# Patient Record
Sex: FEMALE | Race: BLACK OR AFRICAN AMERICAN | NOT HISPANIC OR LATINO | Employment: UNEMPLOYED | URBAN - NONMETROPOLITAN AREA
[De-identification: names, ages, dates, MRNs, and addresses within clinical notes are randomized per-mention and may not be internally consistent; named-entity substitution may affect disease eponyms.]

---

## 2020-09-02 ENCOUNTER — TRANSCRIBE ORDERS (OUTPATIENT)
Dept: ADMINISTRATIVE | Facility: HOSPITAL | Age: 20
End: 2020-09-02

## 2020-09-02 DIAGNOSIS — R10.9 ABDOMINAL PAIN, UNSPECIFIED ABDOMINAL LOCATION: Primary | ICD-10-CM

## 2020-09-02 DIAGNOSIS — R11.10 VOMITING, INTRACTABILITY OF VOMITING NOT SPECIFIED, PRESENCE OF NAUSEA NOT SPECIFIED, UNSPECIFIED VOMITING TYPE: ICD-10-CM

## 2020-09-03 ENCOUNTER — TRANSCRIBE ORDERS (OUTPATIENT)
Dept: ADMINISTRATIVE | Facility: HOSPITAL | Age: 20
End: 2020-09-03

## 2020-09-03 ENCOUNTER — LAB (OUTPATIENT)
Dept: LAB | Facility: HOSPITAL | Age: 20
End: 2020-09-03

## 2020-09-03 DIAGNOSIS — Z01.818 PRE-OP TESTING: Primary | ICD-10-CM

## 2020-09-03 DIAGNOSIS — O21.9 PERSISTENT HYPEREMESIS VOMITING, ARISING DURING PREGNANCY: Primary | ICD-10-CM

## 2020-09-03 LAB — SARS-COV-2 N GENE RESP QL NAA+PROBE: NOT DETECTED

## 2020-09-03 PROCEDURE — 87635 SARS-COV-2 COVID-19 AMP PRB: CPT | Performed by: OBSTETRICS & GYNECOLOGY

## 2020-09-03 PROCEDURE — C9803 HOPD COVID-19 SPEC COLLECT: HCPCS | Performed by: OBSTETRICS & GYNECOLOGY

## 2020-09-04 ENCOUNTER — HOSPITAL ENCOUNTER (OUTPATIENT)
Dept: GENERAL RADIOLOGY | Facility: HOSPITAL | Age: 20
Discharge: HOME | End: 2020-09-04
Admitting: NURSE PRACTITIONER

## 2020-09-04 ENCOUNTER — APPOINTMENT (OUTPATIENT)
Dept: GENERAL RADIOLOGY | Facility: HOSPITAL | Age: 20
End: 2020-09-04

## 2020-09-04 ENCOUNTER — HOSPITAL ENCOUNTER (INPATIENT)
Facility: HOSPITAL | Age: 20
LOS: 4 days | Discharge: HOME | End: 2020-09-08
Attending: OBSTETRICS & GYNECOLOGY | Admitting: OBSTETRICS & GYNECOLOGY

## 2020-09-04 PROBLEM — O21.0 HYPEREMESIS AFFECTING PREGNANCY, ANTEPARTUM: Status: ACTIVE | Noted: 2020-09-04

## 2020-09-04 LAB
ALBUMIN SERPL-MCNC: 3.9 G/DL (ref 3.5–5.2)
ALBUMIN/GLOB SERPL: 1.3 G/DL
ALP SERPL-CCNC: 127 U/L (ref 39–117)
ALT SERPL W P-5'-P-CCNC: 27 U/L (ref 1–33)
ANION GAP SERPL CALCULATED.3IONS-SCNC: 18 MMOL/L (ref 5–15)
AST SERPL-CCNC: 26 U/L (ref 1–32)
BILIRUB SERPL-MCNC: 1 MG/DL (ref 0–1.2)
BUN SERPL-MCNC: 4 MG/DL (ref 6–20)
BUN/CREAT SERPL: 10 (ref 7–25)
CA-I BLD-MCNC: 4.84 MG/DL (ref 4.6–5.4)
CALCIUM SPEC-SCNC: 9.2 MG/DL (ref 8.6–10.5)
CHLORIDE SERPL-SCNC: 101 MMOL/L (ref 98–107)
CHOLEST SERPL-MCNC: 197 MG/DL (ref 0–200)
CO2 SERPL-SCNC: 18 MMOL/L (ref 22–29)
CREAT SERPL-MCNC: 0.4 MG/DL (ref 0.57–1)
CRP SERPL-MCNC: 1.47 MG/DL (ref 0–0.5)
GFR SERPL CREATININE-BSD FRML MDRD: >150 ML/MIN/1.73
GLOBULIN UR ELPH-MCNC: 3.1 GM/DL
GLUCOSE BLDC GLUCOMTR-MCNC: 87 MG/DL (ref 70–130)
GLUCOSE BLDC GLUCOMTR-MCNC: 97 MG/DL (ref 70–130)
GLUCOSE SERPL-MCNC: 93 MG/DL (ref 65–99)
Lab: NORMAL
MAGNESIUM SERPL-MCNC: 2 MG/DL (ref 1.6–2.6)
PHOSPHATE SERPL-MCNC: 2.5 MG/DL (ref 2.5–4.5)
POTASSIUM SERPL-SCNC: 3.7 MMOL/L (ref 3.5–5.2)
PROT SERPL-MCNC: 7 G/DL (ref 6–8.5)
SODIUM SERPL-SCNC: 137 MMOL/L (ref 136–145)
TRIGL SERPL-MCNC: 171 MG/DL (ref 0–150)

## 2020-09-04 PROCEDURE — 84134 ASSAY OF PREALBUMIN: CPT | Performed by: OBSTETRICS & GYNECOLOGY

## 2020-09-04 PROCEDURE — 3E0336Z INTRODUCTION OF NUTRITIONAL SUBSTANCE INTO PERIPHERAL VEIN, PERCUTANEOUS APPROACH: ICD-10-PCS | Performed by: OBSTETRICS & GYNECOLOGY

## 2020-09-04 PROCEDURE — 83735 ASSAY OF MAGNESIUM: CPT | Performed by: OBSTETRICS & GYNECOLOGY

## 2020-09-04 PROCEDURE — 82962 GLUCOSE BLOOD TEST: CPT

## 2020-09-04 PROCEDURE — 02HV33Z INSERTION OF INFUSION DEVICE INTO SUPERIOR VENA CAVA, PERCUTANEOUS APPROACH: ICD-10-PCS | Performed by: OBSTETRICS & GYNECOLOGY

## 2020-09-04 PROCEDURE — 84100 ASSAY OF PHOSPHORUS: CPT | Performed by: OBSTETRICS & GYNECOLOGY

## 2020-09-04 PROCEDURE — 59025 FETAL NON-STRESS TEST: CPT

## 2020-09-04 PROCEDURE — 82465 ASSAY BLD/SERUM CHOLESTEROL: CPT | Performed by: OBSTETRICS & GYNECOLOGY

## 2020-09-04 PROCEDURE — 80053 COMPREHEN METABOLIC PANEL: CPT | Performed by: OBSTETRICS & GYNECOLOGY

## 2020-09-04 PROCEDURE — 84478 ASSAY OF TRIGLYCERIDES: CPT | Performed by: OBSTETRICS & GYNECOLOGY

## 2020-09-04 PROCEDURE — C1751 CATH, INF, PER/CENT/MIDLINE: HCPCS

## 2020-09-04 PROCEDURE — 82330 ASSAY OF CALCIUM: CPT

## 2020-09-04 PROCEDURE — 86140 C-REACTIVE PROTEIN: CPT | Performed by: OBSTETRICS & GYNECOLOGY

## 2020-09-04 PROCEDURE — 74240 X-RAY XM UPR GI TRC 1CNTRST: CPT

## 2020-09-04 RX ORDER — PROMETHAZINE HYDROCHLORIDE 25 MG/1
25 SUPPOSITORY RECTAL EVERY 6 HOURS PRN
Status: DISCONTINUED | OUTPATIENT
Start: 2020-09-04 | End: 2020-09-08 | Stop reason: HOSPADM

## 2020-09-04 RX ORDER — LIDOCAINE HYDROCHLORIDE 10 MG/ML
1 INJECTION, SOLUTION EPIDURAL; INFILTRATION; INTRACAUDAL; PERINEURAL ONCE
Status: COMPLETED | OUTPATIENT
Start: 2020-09-04 | End: 2020-09-04

## 2020-09-04 RX ADMIN — I.V. FAT EMULSION 50 G: 20 EMULSION INTRAVENOUS at 17:15

## 2020-09-04 RX ADMIN — ASCORBIC ACID, VITAMIN A PALMITATE, CHOLECALCIFEROL, THIAMINE HYDROCHLORIDE, RIBOFLAVIN-5 PHOSPHATE SODIUM, PYRIDOXINE HYDROCHLORIDE, NIACINAMIDE, DEXPANTHENOL, ALPHA-TOCOPHEROL ACETATE, VITAMIN K1, FOLIC ACID, BIOTIN, CYANOCOBALAMIN: 200; 3300; 200; 6; 3.6; 6; 40; 15; 10; 150; 600; 60; 5 INJECTION, SOLUTION INTRAVENOUS at 17:14

## 2020-09-04 RX ADMIN — PROMETHAZINE HYDROCHLORIDE 25 MG: 25 SUPPOSITORY RECTAL at 22:17

## 2020-09-04 RX ADMIN — LIDOCAINE HYDROCHLORIDE 1 ML: 10 INJECTION, SOLUTION EPIDURAL; INFILTRATION; INTRACAUDAL; PERINEURAL at 16:12

## 2020-09-04 RX ADMIN — BARIUM SULFATE 75 ML: 0.6 SUSPENSION ORAL at 13:20

## 2020-09-04 NOTE — PROGRESS NOTES
MFM note    Multiple conversations about this patient over the last few weeks    Nubia is a 22yo   Currently 30 6/7 weeks  Prenatal care with Dr Foreman at [facility]    Has had worsening reflux x weeks  Worsened about 2 weeks ago  Has had multiple evaluations at [facility] as well as a 3 day admission secondary to persistent pain, reflux and vomiting. Was going to have in my office yesterday, but wanted the upper GI evaluation and that needed a covid test performed prior (which was done, negative).     Prenatal care otherwise without issue    Primarily benefited from last admission from being npo. Efforts to reintroduce any nutrients since that admission have been met with vomiting including projectile vomiting.     Arranged a limited upper GI evaluation.   Results--Severe intermittent gastroesophageal reflux with gastroparesis. No  mechanical gastric obstruction. No duodenal dilatation or malrotation.    Secondary to her significant symptomatology, the degree of reflux and gastroparesis and  gestational age, I recommend TPN be started and for Nubia to be npo for a prolonged period of time    Unable to get PICC line at [facility].   Discussed with Dr Christian Delacruz to be admitted to [facility] [place] for:  Npo  reglan  protonix  PICC line placement  Dietary consultation with the initiation of TPN (standard formulas)  Plan will be for home TPN therefore will need to have discharge planning/coordination of this service.      As always, if there are any questions/concerns, please do not hesitate to contact me.   Thanks  Nish  [phone]

## 2020-09-04 NOTE — PLAN OF CARE
Problem: Patient Care Overview  Goal: Plan of Care Review  Outcome: Ongoing (interventions implemented as appropriate)  Flowsheets (Taken 9/4/2020 3958)  Progress: no change  Plan of Care Reviewed With: other (see comments) (RN)  Outcome Summary: Received call from LDR RN for TPN consult. Per RN, pt is 30 weeks gestation. She has dx of hyperemesis. She will have a PICC line placed for TPN administration. She is NPO. Recommend to start D20% Aa5% with lipids daily. Recommend to run TPN at 20mL x 24 hours, then increase to goal rate of 65mL/hour. This will provide pt with 1873 kcal and 78 grams of protein. Will follow for nutrition needs and make changes as needed.

## 2020-09-04 NOTE — NON STRESS TEST
Nubia Ochoa, a  at 30w6d with an CARMEN of 2020, by Patient Reported, was seen at [facility] for a nonstress test.    Chief Complaint   Patient presents with   • Other     HERE FOR TPN AND PICC LINE PLACEMENT       Patient Active Problem List   Diagnosis   • Hyperemesis affecting pregnancy, antepartum       Start Time: 1540  Stop Time: 1600    Interpretation A  Nonstress Test Interpretation A: Reactive (20 : Nicci Villalobos, RN)  Comments A: DENI VILLALOBOS RNC/C WALKER RNC (20 : Nicci Villalobos, RN)

## 2020-09-04 NOTE — CONSULTS
Patient or patient's representative gives informed consent after an explanation of the risks (air embolism; catheter occlusion; phlebitis; catheter infection; bloodstream infection; vein thrombosis; hematoma/bleeding at the insertion site; slight discomfort; accidental puncture of an artery, nerve, or tendon; dislodging of device), benefits (longer dwell time, outpatient treatment, medications that cannot run peripherally), and alternatives (short peripheral catheter, centrally inserted central line, or permanent catheter) of PICC placement. Time provided to ask and answer questions.    Pt had 4 Tristanian dual lumen PICC placed in left cephalic vein. Pt tolerated procedure well. 40 cm of catheter internal and 0 cm external. Tip confirmation by 3cg. All lumens flush and draw well. Sterile dressing applied.

## 2020-09-05 LAB
ALBUMIN SERPL-MCNC: 3.9 G/DL (ref 3.5–5.2)
ALBUMIN/GLOB SERPL: 1.3 G/DL
ALP SERPL-CCNC: 133 U/L (ref 39–117)
ALT SERPL W P-5'-P-CCNC: 32 U/L (ref 1–33)
ANION GAP SERPL CALCULATED.3IONS-SCNC: 17 MMOL/L (ref 5–15)
AST SERPL-CCNC: 31 U/L (ref 1–32)
BASOPHILS # BLD AUTO: 0.03 10*3/MM3 (ref 0–0.2)
BASOPHILS NFR BLD AUTO: 0.4 % (ref 0–1.5)
BILIRUB SERPL-MCNC: 1 MG/DL (ref 0–1.2)
BUN SERPL-MCNC: 5 MG/DL (ref 6–20)
BUN/CREAT SERPL: 11.6 (ref 7–25)
CA-I BLD-MCNC: 4.82 MG/DL (ref 4.6–5.4)
CALCIUM SPEC-SCNC: 9.4 MG/DL (ref 8.6–10.5)
CHLORIDE SERPL-SCNC: 103 MMOL/L (ref 98–107)
CO2 SERPL-SCNC: 18 MMOL/L (ref 22–29)
CREAT SERPL-MCNC: 0.43 MG/DL (ref 0.57–1)
DEPRECATED RDW RBC AUTO: 41.1 FL (ref 37–54)
EOSINOPHIL # BLD AUTO: 0.05 10*3/MM3 (ref 0–0.4)
EOSINOPHIL NFR BLD AUTO: 0.7 % (ref 0.3–6.2)
ERYTHROCYTE [DISTWIDTH] IN BLOOD BY AUTOMATED COUNT: 13.2 % (ref 12.3–15.4)
GFR SERPL CREATININE-BSD FRML MDRD: >150 ML/MIN/1.73
GLOBULIN UR ELPH-MCNC: 3.1 GM/DL
GLUCOSE BLDC GLUCOMTR-MCNC: 109 MG/DL (ref 70–130)
GLUCOSE BLDC GLUCOMTR-MCNC: 115 MG/DL (ref 70–130)
GLUCOSE BLDC GLUCOMTR-MCNC: 97 MG/DL (ref 70–130)
GLUCOSE BLDC GLUCOMTR-MCNC: 99 MG/DL (ref 70–130)
GLUCOSE SERPL-MCNC: 122 MG/DL (ref 65–99)
HCT VFR BLD AUTO: 34.7 % (ref 34–46.6)
HGB BLD-MCNC: 11.6 G/DL (ref 12–15.9)
IMM GRANULOCYTES # BLD AUTO: 0.19 10*3/MM3 (ref 0–0.05)
IMM GRANULOCYTES NFR BLD AUTO: 2.5 % (ref 0–0.5)
LYMPHOCYTES # BLD AUTO: 1.18 10*3/MM3 (ref 0.7–3.1)
LYMPHOCYTES NFR BLD AUTO: 15.6 % (ref 19.6–45.3)
Lab: NORMAL
MAGNESIUM SERPL-MCNC: 2.1 MG/DL (ref 1.6–2.6)
MCH RBC QN AUTO: 29 PG (ref 26.6–33)
MCHC RBC AUTO-ENTMCNC: 33.4 G/DL (ref 31.5–35.7)
MCV RBC AUTO: 86.8 FL (ref 79–97)
MONOCYTES # BLD AUTO: 0.8 10*3/MM3 (ref 0.1–0.9)
MONOCYTES NFR BLD AUTO: 10.6 % (ref 5–12)
NEUTROPHILS NFR BLD AUTO: 5.29 10*3/MM3 (ref 1.7–7)
NEUTROPHILS NFR BLD AUTO: 70.2 % (ref 42.7–76)
NRBC BLD AUTO-RTO: 0 /100 WBC (ref 0–0.2)
PHOSPHATE SERPL-MCNC: 2.6 MG/DL (ref 2.5–4.5)
PLATELET # BLD AUTO: 350 10*3/MM3 (ref 140–450)
PMV BLD AUTO: 11.1 FL (ref 6–12)
POTASSIUM SERPL-SCNC: 3.1 MMOL/L (ref 3.5–5.2)
PREALB SERPL-MCNC: 17.9 MG/DL (ref 20–40)
PROT SERPL-MCNC: 7 G/DL (ref 6–8.5)
RBC # BLD AUTO: 4 10*6/MM3 (ref 3.77–5.28)
SODIUM SERPL-SCNC: 138 MMOL/L (ref 136–145)
WBC # BLD AUTO: 7.54 10*3/MM3 (ref 3.4–10.8)
WHOLE BLOOD HOLD SPECIMEN: NORMAL

## 2020-09-05 PROCEDURE — 82962 GLUCOSE BLOOD TEST: CPT

## 2020-09-05 PROCEDURE — 80053 COMPREHEN METABOLIC PANEL: CPT | Performed by: OBSTETRICS & GYNECOLOGY

## 2020-09-05 PROCEDURE — 83735 ASSAY OF MAGNESIUM: CPT | Performed by: OBSTETRICS & GYNECOLOGY

## 2020-09-05 PROCEDURE — 82330 ASSAY OF CALCIUM: CPT

## 2020-09-05 PROCEDURE — 84100 ASSAY OF PHOSPHORUS: CPT | Performed by: OBSTETRICS & GYNECOLOGY

## 2020-09-05 PROCEDURE — 59025 FETAL NON-STRESS TEST: CPT

## 2020-09-05 PROCEDURE — 85025 COMPLETE CBC W/AUTO DIFF WBC: CPT | Performed by: OBSTETRICS & GYNECOLOGY

## 2020-09-05 RX ORDER — PANTOPRAZOLE SODIUM 40 MG/10ML
40 INJECTION, POWDER, LYOPHILIZED, FOR SOLUTION INTRAVENOUS EVERY 12 HOURS SCHEDULED
Status: DISCONTINUED | OUTPATIENT
Start: 2020-09-05 | End: 2020-09-08 | Stop reason: HOSPADM

## 2020-09-05 RX ORDER — METOCLOPRAMIDE HYDROCHLORIDE 5 MG/ML
10 INJECTION INTRAMUSCULAR; INTRAVENOUS EVERY 6 HOURS PRN
Status: DISCONTINUED | OUTPATIENT
Start: 2020-09-05 | End: 2020-09-06

## 2020-09-05 RX ORDER — SODIUM CHLORIDE, SODIUM LACTATE, POTASSIUM CHLORIDE, CALCIUM CHLORIDE 600; 310; 30; 20 MG/100ML; MG/100ML; MG/100ML; MG/100ML
105 INJECTION, SOLUTION INTRAVENOUS CONTINUOUS
Status: DISCONTINUED | OUTPATIENT
Start: 2020-09-05 | End: 2020-09-08 | Stop reason: HOSPADM

## 2020-09-05 RX ORDER — PANTOPRAZOLE SODIUM 40 MG/1
40 TABLET, DELAYED RELEASE ORAL EVERY 12 HOURS SCHEDULED
Status: DISCONTINUED | OUTPATIENT
Start: 2020-09-05 | End: 2020-09-08 | Stop reason: HOSPADM

## 2020-09-05 RX ADMIN — PANTOPRAZOLE SODIUM 40 MG: 40 INJECTION, POWDER, LYOPHILIZED, FOR SOLUTION INTRAVENOUS at 09:12

## 2020-09-05 RX ADMIN — SODIUM CHLORIDE, POTASSIUM CHLORIDE, SODIUM LACTATE AND CALCIUM CHLORIDE 105 ML/HR: 600; 310; 30; 20 INJECTION, SOLUTION INTRAVENOUS at 11:53

## 2020-09-05 RX ADMIN — PANTOPRAZOLE SODIUM 40 MG: 40 INJECTION, POWDER, LYOPHILIZED, FOR SOLUTION INTRAVENOUS at 20:36

## 2020-09-05 RX ADMIN — I.V. FAT EMULSION 50 G: 20 EMULSION INTRAVENOUS at 19:00

## 2020-09-05 RX ADMIN — ASCORBIC ACID, VITAMIN A PALMITATE, CHOLECALCIFEROL, THIAMINE HYDROCHLORIDE, RIBOFLAVIN-5 PHOSPHATE SODIUM, PYRIDOXINE HYDROCHLORIDE, NIACINAMIDE, DEXPANTHENOL, ALPHA-TOCOPHEROL ACETATE, VITAMIN K1, FOLIC ACID, BIOTIN, CYANOCOBALAMIN: 200; 3300; 200; 6; 3.6; 6; 40; 15; 10; 150; 600; 60; 5 INJECTION, SOLUTION INTRAVENOUS at 18:56

## 2020-09-05 RX ADMIN — SODIUM CHLORIDE, POTASSIUM CHLORIDE, SODIUM LACTATE AND CALCIUM CHLORIDE 89 ML/HR: 600; 310; 30; 20 INJECTION, SOLUTION INTRAVENOUS at 20:26

## 2020-09-05 NOTE — H&P
[facility]  Obstetric History and Physical    Chief Complaint   Patient presents with   • Other     HERE FOR TPN AND PICC LINE PLACEMENT       Subjective     Patient is a 20 y.o. female  currently at 31w0d, who was seeing Dr Westbrook. She was having hyperemesis, been hospitalized several times. Upper GI showed gastric reflux and gastroparesis. Dr Westbrook recommended that it was time for hyperalimentation. Bruna had no body that could put in a pick this weekend so she was transferred to [facility] for a pick line and hyperalimentation. Since I was on call [facility] She has lost 12 pounds this past several weeks and feels she cannot keep anything down with out it coming back up. I did notice she had a gall bladder scan that showed sludge.    Her prenatal care is complicated by  hyperemesis gravidarum.  Her previous obstetric/gynecological history is noted for is non-contributory.    The following portions of the patients history were reviewed and updated as appropriate: current medications, allergies, past medical history, past surgical history, past family history, past social history and problem list .       Prenatal Information:   Maternal Prenatal Labs  Blood Type No results found for: ABO   Rh Status No results found for: RH   Antibody Screen No results found for: ABSCRN   Gonnorhea No results found for: GCCX   Chlamydia No results found for: CLAMYDCU   RPR No results found for: RPR   Syphilis Antibody No results found for: SYPHILIS   Rubella No results found for: RUBELLAIGGIN   Hepatitis B Surface Antigen No results found for: HEPBSAG   HIV-1 Antibody No results found for: LABHIV1   Hepatitis C Antibody No results found for: HEPCAB   Rapid Urin Drug Screen No results found for: AMPMETHU, BARBITSCNUR, LABBENZSCN, LABMETHSCN, LABOPIASCN, THCURSCR, COCAINEUR, AMPHETSCREEN, PROPOXSCN, BUPRENORSCNU, METAMPSCNUR, OXYCODONESCN, TRICYCLICSCN   Group B Strep Culture No results found for:  GBSANTIGEN                 Past OB History:     OB History    Para Term  AB Living   2 1 1 0 0 1   SAB TAB Ectopic Molar Multiple Live Births   0 0 0 0 0 1      # Outcome Date GA Lbr Richi/2nd Weight Sex Delivery Anes PTL Lv   2 Current            1 Term 17    M Vag-Spont None  SCOUT       Allergies:  No Known Allergies      Current Facility-Administered Medications:   •  Adult Standard Central TPN, , Intravenous, Q24H (TPN), Last Rate: 20 mL/hr at 20 1714 **AND** Fat Emulsion Plant Based (INTRALIPID,LIPOSYN) 20 % infusion 50 g, 250 mL, Intravenous, Q24H (TPN), Yuri Gonzales MD, Stopped at 20 0530  •  Adult Standard Central TPN, , Intravenous, Q24H (TPN), Yuri Gonzales MD  •  promethazine (PHENERGAN) suppository 25 mg, 25 mg, Rectal, Q6H PRN, Yuri Gonzales MD, 25 mg at 20 2217    HPI      Review of Systems       Otherwise complete ROS reviewed and negative except as mentioned in the HPI.    History reviewed. No pertinent past medical history.    Past Medical History: none    Past Surgical History:   Procedure Laterality Date   • TONSILLECTOMY         Past Surgical History:  This patient has no significant past surgical history    Social History     Socioeconomic History   • Marital status:      Spouse name: Not on file   • Number of children: Not on file   • Years of education: Not on file   • Highest education level: Not on file   Tobacco Use   • Smoking status: Never Smoker   • Smokeless tobacco: Never Used   Substance and Sexual Activity   • Drug use: Never       Social History:  Marital Status:  Single                              Smoker:  Never smoker                             Illicit Drugs:  Illicit drug use:  none                             Alcohol:  Alcohol use: none    History reviewed. No pertinent family history.                              Family History:   Non-Contributory           Objective     Vital Signs Range for the last 24  hours  Temperature: Temp:  [97.4 °F (36.3 °C)-98.4 °F (36.9 °C)] 97.4 °F (36.3 °C)   Temp Source: Temp src: Temporal   BP: BP: (109-126)/(62-76) 116/63   Pulse: Heart Rate:  [] 104   Respirations: Resp:  [16-18] 16   SPO2: SpO2:  [97 %-100 %] 97 %   O2 Amount (l/min):     O2 Devices Device (Oxygen Therapy): room air   Weight: Weight:  [75.3 kg (166 lb)] 75.3 kg (166 lb)     General appearance:  alert, , oriented to person, place, and time, anxious  Mental Status:  normal mood, behavior, speech, dress, motor activity, and thought processes, anxious  Eyes:  pupils equal and reactive to light  Ears:  bilateral TM's and external ear canals normal  Nose:  normal and patent, no erythema, discharge or polyps  Mouth:  mucous membranes moist, pharynx normal without lesions  Neck:  supple, no significant adenopathy  Lymphatics:  no palpable lymphadenopathy, no hepatosplenomegaly  Chest:  clear to auscultation, no wheezes, rales or rhonchi, symmetric air entry  Heart::  normal rate, regular rhythm, normal S1, S2, no murmurs, rubs, clicks or gallops  Abdomen:  soft, nontender, nondistended, no masses or organomegaly  Breasts:  breasts appear normal, no suspicious masses, no skin or nipple changes or axillary nodes  Pelvic:  UTERUS: uterus is normal size, shape, consistency and nontender, enlarged to 30 week's size  Rectal:  normal rectal, no masses  Back exam:  full range of motion, no tenderness, palpable spasm or pain on motion  Neurological:  alert, oriented, normal speech, no focal findings or movement disorder noted  Musculoskeletal:  no joint tenderness, deformity or swelling  Extremities:   peripheral pulses normal, no pedal edema, no clubbing or cyanosis  Skin:  normal coloration and turgor, no rashes, no suspicious skin lesions noted      Presentation: vertex   Cervix: Exam by:     Dilation:closed     Effacement:thick     Station:OOP       Fetal Heart Rate Assessment   Method: Fetal HR Assessment Method: external    Beats/min: Fetal HR (beats/min): 140   Baseline: Fetal Heart Baseline Rate: normal range   Varibility: Fetal HR Variability: moderate (amplitude range 6 to 25 bpm)   Accels: Fetal HR Accelerations: greater than/equal to 15 bpm, lasting at least 15 seconds   Decels: Fetal HR Decelerations: variable   Tracing Category:       Uterine Assessment   Method: Method: palpation, external tocotransducer   Frequency (min): Contraction Frequency (Minutes): occaisonal   Ctx Count in 10 min:     Duration:     Intensity: Contraction Intensity: mild by palpation   Intensity by IUPC:     Resting Tone: Uterine Resting Tone: soft by palpation   Resting Tone by IUPC:     [place] Units:       Laboratory Results: Reviewed  Radiology Review: Yes      Assessment/Plan       Assessment:  1.  Intrauterine pregnancy at 31w0d weeks gestation with reactive fetal status.    2.  nausea/vomiting secondary to gastric reflux and gastroparesis  3.  Obstetrical history significant for persistent nausea and vomiting failed conservative management.  4.  GBS status: No results found for: GBSANTIGEN    Plan:  1. NPO with hyperalimentation 2. Plan of care has been reviewed with patient and male   3.  Risks, benefits of treatment plan have been discussed.  4.  All questions have been answered.  5.        Yuri Gonzales MD  9/5/2020  06:53

## 2020-09-05 NOTE — PROGRESS NOTES
Continued Stay Note   Riki     Patient Name: Nubia Ochoa  MRN: [id number]  Today's Date: 9/5/2020    Admit Date: 9/4/2020    Discharge Plan     Row Name 09/05/20 1502       Plan    Plan Comments  RENA contacted Robert from [facility] in regards to TPN. Robert plans on contacting SW tomorrow to make sure they have all information needed. Possible discharge for tuesday due to holiday. RENA will await phone call.         Discharge Codes    No documentation.             Marquita Cantrell

## 2020-09-05 NOTE — PLAN OF CARE
Vss during shift. Voiding and ambulating in room. PICC in place. TPN and LR infusing. X1 emesis this AM but since has only been spitting into emesis bag. NPO. Had shower today. Spoke with  who will line up home health for TPN administration at home.

## 2020-09-05 NOTE — PROGRESS NOTES
Faterohini Ochoa  : 2000  MRN: [id number]  CSN: [id number]    Antepartum Progress Note    Subjective   19 yo  at 30 weeks with severe hyperemesis and failed conservative management. Emesis times 3 last evening, better with phenergan supp. Patient complains of being very thirsty.     Objective     Min/max vitals past 24 hours:   Temp  Min: 97.4 °F (36.3 °C)  Max: 98.4 °F (36.9 °C)  BP  Min: 109/62  Max: 126/76  Pulse  Min: 80  Max: 121  Resp  Min: 16  Max: 18         General: well developed; well nourished  no acute distress   Heart: Not performed.   Lungs: breathing is unlabored   Abdomen: soft, non-tender; no masses  no umbilical or inguinal hernias are present  no hepato-splenomegaly  fundus firm and non-tender   FHT's: reactive and category 1.  external monitors used   Cervix: was not checked.   Contractions: none   Back: bilateral CVA tenderness is absent           Assessment   1. IUP at 31w0d  2. Hyperemesis with severe gastric reflux and gastroparesis     Plan   1. NPO, parenteral hydration, fetal monitoring    Yuri Gonzales MD  2020  07:07

## 2020-09-05 NOTE — PLAN OF CARE
Problem: Patient Care Overview  Goal: Plan of Care Review  Outcome: Ongoing (interventions implemented as appropriate)  Flowsheets  Taken 9/5/2020 0550  Progress: no change  Outcome Summary: VSS with mild tachycardia. Voiding WNL. Ambulating to restroom. Has had n/v this shift. Order for Phenergan suppository obtained which provides some relief. TPN and Lipids infusing. NPO. Labs ordered. Blood glucose every 6 hours. NST every shift, which as reactive this am. Continue to monitor.  Taken 9/4/2020 2100  Plan of Care Reviewed With: patient;significant other

## 2020-09-05 NOTE — NON STRESS TEST
Nubia Ochoa, a  at 31w0d with an CARMEN of 2020, by Patient Reported, was seen at [facility] for a nonstress test.    Chief Complaint   Patient presents with   • Other     HERE FOR TPN AND PICC LINE PLACEMENT       Patient Active Problem List   Diagnosis   • Hyperemesis affecting pregnancy, antepartum       Start Time:   Stop Time:     Interpretation A  Nonstress Test Interpretation A: Reactive (20 : Kateryna Logan, RN)  Comments A: Verified per HERLINDA Logan RN and HERMANN Bonner RN  (20 : Kateryna Logan, RN)

## 2020-09-05 NOTE — NON STRESS TEST
Nubia Ochoa, a  at 31w0d with an CARMEN of 2020, by Patient Reported, was seen at [facility] for a nonstress test.    Chief Complaint   Patient presents with   • Other     HERE FOR TPN AND PICC LINE PLACEMENT       Patient Active Problem List   Diagnosis   • Hyperemesis affecting pregnancy, antepartum       Start Time: 0458  Stop Time: 0520    Interpretation A  Nonstress Test Interpretation A: Reactive (20 : Lashonda Gan, RN)  Comments A: Verified per HUSSEIN Gan, REJI and HERLINDA Rubio RN (20 : Lashonda Gan, RN)      Mobile monitor not tracing on Obix.  See printed tracing for strip.

## 2020-09-06 LAB
ALBUMIN SERPL-MCNC: 3.4 G/DL (ref 3.5–5.2)
ALBUMIN/GLOB SERPL: 1.1 G/DL
ALP SERPL-CCNC: 120 U/L (ref 39–117)
ALT SERPL W P-5'-P-CCNC: 29 U/L (ref 1–33)
ANION GAP SERPL CALCULATED.3IONS-SCNC: 12 MMOL/L (ref 5–15)
AST SERPL-CCNC: 24 U/L (ref 1–32)
BASOPHILS # BLD AUTO: 0.03 10*3/MM3 (ref 0–0.2)
BASOPHILS NFR BLD AUTO: 0.5 % (ref 0–1.5)
BILIRUB SERPL-MCNC: 1.2 MG/DL (ref 0–1.2)
BUN SERPL-MCNC: 9 MG/DL (ref 6–20)
BUN/CREAT SERPL: 23.7 (ref 7–25)
CA-I BLD-MCNC: 4.9 MG/DL (ref 4.6–5.4)
CALCIUM SPEC-SCNC: 9.1 MG/DL (ref 8.6–10.5)
CHLORIDE SERPL-SCNC: 108 MMOL/L (ref 98–107)
CO2 SERPL-SCNC: 23 MMOL/L (ref 22–29)
CREAT SERPL-MCNC: 0.38 MG/DL (ref 0.57–1)
DEPRECATED RDW RBC AUTO: 42.7 FL (ref 37–54)
EOSINOPHIL # BLD AUTO: 0.06 10*3/MM3 (ref 0–0.4)
EOSINOPHIL NFR BLD AUTO: 1 % (ref 0.3–6.2)
ERYTHROCYTE [DISTWIDTH] IN BLOOD BY AUTOMATED COUNT: 13.4 % (ref 12.3–15.4)
GFR SERPL CREATININE-BSD FRML MDRD: >150 ML/MIN/1.73
GLOBULIN UR ELPH-MCNC: 3 GM/DL
GLUCOSE BLDC GLUCOMTR-MCNC: 109 MG/DL (ref 70–130)
GLUCOSE BLDC GLUCOMTR-MCNC: 123 MG/DL (ref 70–130)
GLUCOSE BLDC GLUCOMTR-MCNC: 124 MG/DL (ref 70–130)
GLUCOSE BLDC GLUCOMTR-MCNC: 98 MG/DL (ref 70–130)
GLUCOSE SERPL-MCNC: 110 MG/DL (ref 65–99)
HCT VFR BLD AUTO: 34.4 % (ref 34–46.6)
HGB BLD-MCNC: 11.4 G/DL (ref 12–15.9)
IMM GRANULOCYTES # BLD AUTO: 0.05 10*3/MM3 (ref 0–0.05)
IMM GRANULOCYTES NFR BLD AUTO: 0.8 % (ref 0–0.5)
LYMPHOCYTES # BLD AUTO: 1.06 10*3/MM3 (ref 0.7–3.1)
LYMPHOCYTES NFR BLD AUTO: 17.3 % (ref 19.6–45.3)
Lab: NORMAL
MAGNESIUM SERPL-MCNC: 1.9 MG/DL (ref 1.6–2.6)
MCH RBC QN AUTO: 29.5 PG (ref 26.6–33)
MCHC RBC AUTO-ENTMCNC: 33.1 G/DL (ref 31.5–35.7)
MCV RBC AUTO: 88.9 FL (ref 79–97)
MONOCYTES # BLD AUTO: 0.73 10*3/MM3 (ref 0.1–0.9)
MONOCYTES NFR BLD AUTO: 11.9 % (ref 5–12)
NEUTROPHILS NFR BLD AUTO: 4.19 10*3/MM3 (ref 1.7–7)
NEUTROPHILS NFR BLD AUTO: 68.5 % (ref 42.7–76)
NRBC BLD AUTO-RTO: 0 /100 WBC (ref 0–0.2)
PHOSPHATE SERPL-MCNC: 2.4 MG/DL (ref 2.5–4.5)
PLATELET # BLD AUTO: 296 10*3/MM3 (ref 140–450)
PMV BLD AUTO: 10.7 FL (ref 6–12)
POTASSIUM SERPL-SCNC: 3 MMOL/L (ref 3.5–5.2)
PROT SERPL-MCNC: 6.4 G/DL (ref 6–8.5)
RBC # BLD AUTO: 3.87 10*6/MM3 (ref 3.77–5.28)
SODIUM SERPL-SCNC: 143 MMOL/L (ref 136–145)
WBC # BLD AUTO: 6.12 10*3/MM3 (ref 3.4–10.8)

## 2020-09-06 PROCEDURE — 80053 COMPREHEN METABOLIC PANEL: CPT | Performed by: OBSTETRICS & GYNECOLOGY

## 2020-09-06 PROCEDURE — 83735 ASSAY OF MAGNESIUM: CPT | Performed by: OBSTETRICS & GYNECOLOGY

## 2020-09-06 PROCEDURE — 59025 FETAL NON-STRESS TEST: CPT

## 2020-09-06 PROCEDURE — 85025 COMPLETE CBC W/AUTO DIFF WBC: CPT | Performed by: OBSTETRICS & GYNECOLOGY

## 2020-09-06 PROCEDURE — 25010000002 METOCLOPRAMIDE PER 10 MG: Performed by: OBSTETRICS & GYNECOLOGY

## 2020-09-06 PROCEDURE — 82962 GLUCOSE BLOOD TEST: CPT

## 2020-09-06 PROCEDURE — 82330 ASSAY OF CALCIUM: CPT

## 2020-09-06 PROCEDURE — 84100 ASSAY OF PHOSPHORUS: CPT | Performed by: OBSTETRICS & GYNECOLOGY

## 2020-09-06 RX ORDER — METOCLOPRAMIDE HYDROCHLORIDE 5 MG/ML
10 INJECTION INTRAMUSCULAR; INTRAVENOUS EVERY 8 HOURS
Status: DISCONTINUED | OUTPATIENT
Start: 2020-09-06 | End: 2020-09-08 | Stop reason: HOSPADM

## 2020-09-06 RX ADMIN — I.V. FAT EMULSION 50 G: 20 EMULSION INTRAVENOUS at 18:48

## 2020-09-06 RX ADMIN — METOCLOPRAMIDE 10 MG: 5 INJECTION, SOLUTION INTRAMUSCULAR; INTRAVENOUS at 11:07

## 2020-09-06 RX ADMIN — PANTOPRAZOLE SODIUM 40 MG: 40 INJECTION, POWDER, LYOPHILIZED, FOR SOLUTION INTRAVENOUS at 08:41

## 2020-09-06 RX ADMIN — PANTOPRAZOLE SODIUM 40 MG: 40 INJECTION, POWDER, LYOPHILIZED, FOR SOLUTION INTRAVENOUS at 21:05

## 2020-09-06 RX ADMIN — SODIUM CHLORIDE, POTASSIUM CHLORIDE, SODIUM LACTATE AND CALCIUM CHLORIDE 60 ML/HR: 600; 310; 30; 20 INJECTION, SOLUTION INTRAVENOUS at 11:07

## 2020-09-06 RX ADMIN — METOCLOPRAMIDE 10 MG: 5 INJECTION, SOLUTION INTRAMUSCULAR; INTRAVENOUS at 18:48

## 2020-09-06 RX ADMIN — ASCORBIC ACID, VITAMIN A PALMITATE, CHOLECALCIFEROL, THIAMINE HYDROCHLORIDE, RIBOFLAVIN-5 PHOSPHATE SODIUM, PYRIDOXINE HYDROCHLORIDE, NIACINAMIDE, DEXPANTHENOL, ALPHA-TOCOPHEROL ACETATE, VITAMIN K1, FOLIC ACID, BIOTIN, CYANOCOBALAMIN: 200; 3300; 200; 6; 3.6; 6; 40; 15; 10; 150; 600; 60; 5 INJECTION, SOLUTION INTRAVENOUS at 18:48

## 2020-09-06 NOTE — PROGRESS NOTES
Call placed to Yulia in lab about ionized CA order from 0600 this AM. She stated that she didn't have an order on her for ionized ca and that labs had already been drawn. RT looked into order it was started on Friday 9/4 for 3 occurences at 6 AM to end today 9/6 @ 0600 - then q Monday @ 0600. She will draw lab test and call RT when done for processing.

## 2020-09-06 NOTE — PAYOR COMM NOTE
"ADMIT INPT 20  UR  [phone]    PLEASE NOTE:  NOT LATE NOTIFICATION DUE TO WEEKEND    Ken Ochoa (20 y.o. Female)     Date of Birth Social Security Number Address Home Phone MRN    2000  [address] [place] [address] [phone] [id number]    [employer] Mar[employer] Status          Confucianism        Admission Date Admission Type Admitting Provider Attending Provider Department, Room/Bed    20 Elective Yuri Gonzales MD England, Gary Thomas, MD [facility] MOTHER BABY 2A, M206/    Discharge Date Discharge Disposition Discharge Destination                       Attending Provider:  Yuri Gonzales MD    Allergies:  No Known Allergies    Isolation:  None   Infection:  None   Code Status:  CPR    Ht:  160 cm (63\")   Wt:  74.5 kg (164 lb 3.2 oz)    Admission Cmt:  None   Principal Problem:  None                Active Insurance as of 2020     Primary Coverage     Payor Plan Insurance Group Employer/Plan Group    [employer] [place] [employer] [place]      Payor Plan Address Payor Plan Phone Number Payor Plan Fax Number Effective Dates    [address] [address] [phone]  2020 - None Entered    [facility] [id number]       Subscriber Name Subscriber Birth Date Member ID       KEN OCHOA 2000 [id number]                 Emergency Contacts      (Rel.) Home Phone Work Phone Mobile Phone    MEAGAN MYERS (Mother) -- -- [phone]    MIGUELINA OCHOA (Spouse) [phone] -- --               History & Physical      Yuri Gonzales MD at 20 0652          [facility]  Obstetric History and Physical    Chief Complaint   Patient presents with   • Other     HERE FOR TPN AND PICC LINE PLACEMENT       Subjective     Patient is a 20 y.o. female  currently at 31w0d, who was seeing Dr Westbrook. She was having hyperemesis, been hospitalized several times. Upper GI showed gastric reflux and gastroparesis. Dr Westbrook recommended that it was " time for hyperalimentation. Bruna had no body that could put in a pick this weekend so she was transferred to [facility] for a pick line and hyperalimentation. Since [facility] was on call [facility]. She has lost 12 pounds this past several weeks and feels she cannot keep anything down with out it coming back up. I did notice she had a gall bladder scan that showed sludge.    Her prenatal care is complicated by  hyperemesis gravidarum.  Her previous obstetric/gynecological history is noted for is non-contributory.    The following portions of the patients history were reviewed and updated as appropriate: current medications, allergies, past medical history, past surgical history, past family history, past social history and problem list .       Prenatal Information:   Maternal Prenatal Labs  Blood Type No results found for: ABO   Rh Status No results found for: RH   Antibody Screen No results found for: ABSCRN   Gonnorhea No results found for: GCCX   Chlamydia No results found for: CLAMYDCU   RPR No results found for: RPR   Syphilis Antibody No results found for: SYPHILIS   Rubella No results found for: RUBELLAIGGIN   Hepatitis B Surface Antigen No results found for: HEPBSAG   HIV-1 Antibody No results found for: LABHIV1   Hepatitis C Antibody No results found for: HEPCAB   Rapid Urin Drug Screen No results found for: AMPMETHU, BARBITSCNUR, LABBENZSCN, LABMETHSCN, LABOPIASCN, THCURSCR, COCAINEUR, AMPHETSCREEN, PROPOXSCN, BUPRENORSCNU, METAMPSCNUR, OXYCODONESCN, TRICYCLICSCN   Group B Strep Culture No results found for: GBSANTIGEN                 Past OB History:     OB History    Para Term  AB Living   2 1 1 0 0 1   SAB TAB Ectopic Molar Multiple Live Births   0 0 0 0 0 1      # Outcome Date GA Lbr Richi/2nd Weight Sex Delivery Anes PTL Lv   2 Current            1 Term 17    M Vag-Spont None  SCOUT       Allergies:  No Known Allergies      Current Facility-Administered Medications:   •   Adult Standard Central TPN, , Intravenous, Q24H (TPN), Last Rate: 20 mL/hr at 09/04/20 1714 **AND** Fat Emulsion Plant Based (INTRALIPID,LIPOSYN) 20 % infusion 50 g, 250 mL, Intravenous, Q24H (TPN), Yuri Gonzales MD, Stopped at 09/05/20 0530  •  Adult Standard Central TPN, , Intravenous, Q24H (TPN), Yuri Gonzales MD  •  promethazine (PHENERGAN) suppository 25 mg, 25 mg, Rectal, Q6H PRN, Yuri Gonzales MD, 25 mg at 09/04/20 2217    HPI      Review of Systems       Otherwise complete ROS reviewed and negative except as mentioned in the HPI.    History reviewed. No pertinent past medical history.    Past Medical History: none    Past Surgical History:   Procedure Laterality Date   • TONSILLECTOMY         Past Surgical History:  This patient has no significant past surgical history    Social History     Socioeconomic History   • Marital status:      Spouse name: Not on file   • Number of children: Not on file   • Years of education: Not on file   • Highest education level: Not on file   Tobacco Use   • Smoking status: Never Smoker   • Smokeless tobacco: Never Used   Substance and Sexual Activity   • Drug use: Never       Social History:  Marital Status:  Single                              Smoker:  Never smoker                             Illicit Drugs:  Illicit drug use:  none                             Alcohol:  Alcohol use: none    History reviewed. No pertinent family history.                              Family History:   Non-Contributory           Objective     Vital Signs Range for the last 24 hours  Temperature: Temp:  [97.4 °F (36.3 °C)-98.4 °F (36.9 °C)] 97.4 °F (36.3 °C)   Temp Source: Temp src: Temporal   BP: BP: (109-126)/(62-76) 116/63   Pulse: Heart Rate:  [] 104   Respirations: Resp:  [16-18] 16   SPO2: SpO2:  [97 %-100 %] 97 %   O2 Amount (l/min):     O2 Devices Device (Oxygen Therapy): room air   Weight: Weight:  [75.3 kg (166 lb)] 75.3 kg (166 lb)     General  appearance:  alert, , oriented to person, place, and time, anxious  Mental Status:  normal mood, behavior, speech, dress, motor activity, and thought processes, anxious  Eyes:  pupils equal and reactive to light  Ears:  bilateral TM's and external ear canals normal  Nose:  normal and patent, no erythema, discharge or polyps  Mouth:  mucous membranes moist, pharynx normal without lesions  Neck:  supple, no significant adenopathy  Lymphatics:  no palpable lymphadenopathy, no hepatosplenomegaly  Chest:  clear to auscultation, no wheezes, rales or rhonchi, symmetric air entry  Heart::  normal rate, regular rhythm, normal S1, S2, no murmurs, rubs, clicks or gallops  Abdomen:  soft, nontender, nondistended, no masses or organomegaly  Breasts:  breasts appear normal, no suspicious masses, no skin or nipple changes or axillary nodes  Pelvic:  UTERUS: uterus is normal size, shape, consistency and nontender, enlarged to 30 week's size  Rectal:  normal rectal, no masses  Back exam:  full range of motion, no tenderness, palpable spasm or pain on motion  Neurological:  alert, oriented, normal speech, no focal findings or movement disorder noted  Musculoskeletal:  no joint tenderness, deformity or swelling  Extremities:   peripheral pulses normal, no pedal edema, no clubbing or cyanosis  Skin:  normal coloration and turgor, no rashes, no suspicious skin lesions noted      Presentation: vertex   Cervix: Exam by:     Dilation:closed     Effacement:thick     Station:OOP       Fetal Heart Rate Assessment   Method: Fetal HR Assessment Method: external   Beats/min: Fetal HR (beats/min): 140   Baseline: Fetal Heart Baseline Rate: normal range   Varibility: Fetal HR Variability: moderate (amplitude range 6 to 25 bpm)   Accels: Fetal HR Accelerations: greater than/equal to 15 bpm, lasting at least 15 seconds   Decels: Fetal HR Decelerations: variable   Tracing Category:       Uterine Assessment   Method: Method: palpation, external  tocotransducer   Frequency (min): Contraction Frequency (Minutes): occaisonal   Ctx Count in 10 min:     Duration:     Intensity: Contraction Intensity: mild by palpation   Intensity by IUPC:     Resting Tone: Uterine Resting Tone: soft by palpation   Resting Tone by IUPC:     Fatou Units:       Laboratory Results: Reviewed  Radiology Review: Yes      Assessment/Plan       Assessment:  1.  Intrauterine pregnancy at 31w0d weeks gestation with reactive fetal status.    2.  nausea/vomiting secondary to gastric reflux and gastroparesis  3.  Obstetrical history significant for persistent nausea and vomiting failed conservative management.  4.  GBS status: No results found for: GBSANTIGEN    Plan:  1. NPO with hyperalimentation 2. Plan of care has been reviewed with patient and male   3.  Risks, benefits of treatment plan have been discussed.  4.  All questions have been answered.  5.        Yuri Gonzales MD  9/5/2020  06:53      Electronically signed by Yuri Gonzales MD at 09/05/20 0706       Emergency Department Notes    No notes of this type exist for this encounter.         Vital Signs (last 3 days)     Date/Time   Temp   Temp src   Pulse   Resp   BP   Patient Position   SpO2    09/06/20 0840   97.8 (36.6)   Temporal   97   17   110/60   Lying   99    09/06/20 0500   98.1 (36.7)   Oral   113   18   112/72   Sitting   96    09/06/20 0018   97.8 (36.6)   Oral   108   20   121/78   Sitting   --    09/05/20 2029   98 (36.7)   Temporal   110   18   125/67   Sitting   100    09/05/20 1600   97.9 (36.6)   Temporal   98   18   115/62   Sitting   98    09/05/20 1200   98 (36.7)   Temporal   101   16   118/72   Sitting   99    09/05/20 0908   97.6 (36.4)   Temporal   99   16   124/80   Sitting   98    09/05/20 0334   97.4 (36.3)   Temporal   104   16   116/63   Sitting   97    09/05/20 0002   98.2 (36.8)   Temporal   117   18   118/69   Sitting   100    09/04/20 2100   98.4 (36.9)   Oral   (!) 121    18   121/74   Sitting   97    09/04/20 1750   98.3 (36.8)   Oral   112   16   126/76   Sitting   98    09/04/20 1450   98.2 (36.8)   Temporal   80   18   109/62   Lying   --              Oxygen Therapy (last 3 days)     Date/Time   SpO2   Device (Oxygen Therapy)   Flow (L/min)   Oxygen Concentration (%)   ETCO2 (mmHg)    09/06/20 0840   99   room air   --   --   --    09/06/20 0500   96   room air   --   --   --    09/05/20 2029   100   room air   --   --   --    09/05/20 1600   98   room air   --   --   --    09/05/20 1200   99   room air   --   --   --    09/05/20 0908   98   room air   --   --   --    09/05/20 0334   97   room air   --   --   --    09/05/20 0002   100   room air   --   --   --    09/04/20 2100   97   room air   --   --   --    09/04/20 1750   98   --   --   --   --            Intake & Output (last 3 days)       09/03 0701 - 09/04 0700 09/04 0701 - 09/05 0700 09/05 0701 - 09/06 0700 09/06 0701 - 09/07 0700    I.V. (mL/kg)   819 (11) 156.6 (2.1)    TPN  456.8 1023 173.1    Total Intake(mL/kg)  456.8 (6.1) 1842 (24.7) 329.7 (4.4)    Urine (mL/kg/hr)  350 600 (0.3)     Emesis/NG output  500 200     Total Output  850 800     Net  -393.2 +1042 +329.7            Emesis Unmeasured Occurrence  1 x           Lines, Drains & Airways    Active LDAs     Name:   Placement date:   Placement time:   Site:   Days:    PICC Double Lumen 09/04/20 Left Cephalic   09/04/20    1613    Cephalic   1         Inactive LDAs     None                  Facility-Administered Medications as of 9/6/2020   Medication Dose Route Frequency Provider Last Rate Last Dose   • Adult Standard Central TPN   Intravenous Q24H (TPN) Yuri Gonzales MD 65 mL/hr at 09/06/20 0950     • [COMPLETED] Barium Sulfate oral suspension 75 mL  75 mL Oral Once Madalyn Hickey APRN   75 mL at 09/04/20 1320   • Fat Emulsion Plant Based (INTRALIPID,LIPOSYN) 20 % infusion 50 g  250 mL Intravenous Q24H (TPN) Yuri Gonzales MD   Stopped at  09/06/20 0700   • lactated ringers infusion  105 mL/hr Intravenous Continuous Yuri Gnozales MD 60 mL/hr at 09/06/20 0950 60 mL/hr at 09/06/20 0950   • [COMPLETED] lidocaine PF 1% (XYLOCAINE) injection 1 mL  1 mL Injection Once Yuri Gonzales MD   1 mL at 09/04/20 1612   • metoclopramide (REGLAN) injection 10 mg  10 mg Intravenous Q8H Sebastian Westbrook MD       • pantoprazole (PROTONIX) EC tablet 40 mg  40 mg Oral Q12H Yuri Gonzales MD        Or   • pantoprazole (PROTONIX) injection 40 mg  40 mg Intravenous Q12H Yuri Gonzales MD   40 mg at 09/06/20 0841   • promethazine (PHENERGAN) suppository 25 mg  25 mg Rectal Q6H PRN Yuri Gonzales MD   25 mg at 09/04/20 2217     Lab Results (last 72 hours)     Procedure Component Value Units Date/Time    Calcium, Ionized [[id number]] Collected:  09/06/20 0819    Specimen:  Blood Updated:  09/06/20 0847     Ionized Calcium 4.90 mg/dL      Collected by [id number]     Comment: Meter: [id number]     :  [id number]       POC Glucose Once [[id number]]  (Normal) Collected:  09/06/20 0625    Specimen:  Blood Updated:  09/06/20 0637     Glucose 123 mg/dL      Comment: : [id number] Grzegorz Monroy ID: [id number]       Comprehensive Metabolic Panel [[id number]]  (Abnormal) Collected:  09/06/20 0540    Specimen:  Blood Updated:  09/06/20 0624     Glucose 110 mg/dL      BUN 9 mg/dL      Creatinine 0.38 mg/dL      Sodium 143 mmol/L      Potassium 3.0 mmol/L      Chloride 108 mmol/L      CO2 23.0 mmol/L      Calcium 9.1 mg/dL      Total Protein 6.4 g/dL      Albumin 3.40 g/dL      ALT (SGPT) 29 U/L      AST (SGOT) 24 U/L      Alkaline Phosphatase 120 U/L      Total Bilirubin 1.2 mg/dL      eGFR  African Amer >150 mL/min/1.73      Globulin 3.0 gm/dL      A/G Ratio 1.1 g/dL      BUN/Creatinine Ratio 23.7     Anion Gap 12.0 mmol/L     Narrative:       GFR Normal >60  Chronic Kidney Disease <60  Kidney Failure <15      Magnesium  [[id number]]  (Normal) Collected:  09/06/20 0540    Specimen:  Blood Updated:  09/06/20 0622     Magnesium 1.9 mg/dL     Phosphorus [[id number]]  (Abnormal) Collected:  09/06/20 0540    Specimen:  Blood Updated:  09/06/20 0622     Phosphorus 2.4 mg/dL     CBC & Differential [[id number]] Collected:  09/06/20 0540    Specimen:  Blood Updated:  09/06/20 0600    Narrative:       The following orders were created for panel order CBC & Differential.  Procedure                               Abnormality         Status                     ---------                               -----------         ------                     CBC Auto Differential[[id number]]        Abnormal            Final result                 Please view results for these tests on the individual orders.    CBC Auto Differential [[id number]]  (Abnormal) Collected:  09/06/20 0540    Specimen:  Blood Updated:  09/06/20 0600     WBC 6.12 10*3/mm3      RBC 3.87 10*6/mm3      Hemoglobin 11.4 g/dL      Hematocrit 34.4 %      MCV 88.9 fL      MCH 29.5 pg      MCHC 33.1 g/dL      RDW 13.4 %      RDW-SD 42.7 fl      MPV 10.7 fL      Platelets 296 10*3/mm3      Neutrophil % 68.5 %      Lymphocyte % 17.3 %      Monocyte % 11.9 %      Eosinophil % 1.0 %      Basophil % 0.5 %      Immature Grans % 0.8 %      Neutrophils, Absolute 4.19 10*3/mm3      Lymphocytes, Absolute 1.06 10*3/mm3      Monocytes, Absolute 0.73 10*3/mm3      Eosinophils, Absolute 0.06 10*3/mm3      Basophils, Absolute 0.03 10*3/mm3      Immature Grans, Absolute 0.05 10*3/mm3      nRBC 0.0 /100 WBC     POC Glucose Once [[id number]]  (Normal) Collected:  09/06/20 0020    Specimen:  Blood Updated:  09/06/20 0032     Glucose 109 mg/dL      Comment: : [id number] Grzegorz Monroy ID: [id number]       POC Glucose Once [[id number]]  (Normal) Collected:  09/05/20 1756    Specimen:  Blood Updated:  09/05/20 1817     Glucose 99 mg/dL      Comment: : [id number] Yosi Beard (Haley)er ID:  [id number]       POC Glucose Once [[id number]]  (Normal) Collected:  09/05/20 1200    Specimen:  Blood Updated:  09/05/20 1222     Glucose 115 mg/dL      Comment: : [id number] Yosi Glaser (Haley)Meter ID: [id number]       CBC & Differential [[id number]] Collected:  09/05/20 0551    Specimen:  Blood Updated:  09/05/20 0746    Narrative:       The following orders were created for panel order CBC & Differential.  Procedure                               Abnormality         Status                     ---------                               -----------         ------                     CBC Auto Differential[[id number]]        Abnormal            Final result                 Please view results for these tests on the individual orders.    CBC Auto Differential [[id number]]  (Abnormal) Collected:  09/05/20 0551    Specimen:  Blood Updated:  09/05/20 0746     WBC 7.54 10*3/mm3      RBC 4.00 10*6/mm3      Hemoglobin 11.6 g/dL      Hematocrit 34.7 %      MCV 86.8 fL      MCH 29.0 pg      MCHC 33.4 g/dL      RDW 13.2 %      RDW-SD 41.1 fl      MPV 11.1 fL      Platelets 350 10*3/mm3      Neutrophil % 70.2 %      Lymphocyte % 15.6 %      Monocyte % 10.6 %      Eosinophil % 0.7 %      Basophil % 0.4 %      Immature Grans % 2.5 %      Neutrophils, Absolute 5.29 10*3/mm3      Lymphocytes, Absolute 1.18 10*3/mm3      Monocytes, Absolute 0.80 10*3/mm3      Eosinophils, Absolute 0.05 10*3/mm3      Basophils, Absolute 0.03 10*3/mm3      Immature Grans, Absolute 0.19 10*3/mm3      nRBC 0.0 /100 WBC     Calcium, Ionized [[id number]] Collected:  09/05/20 0551    Specimen:  Blood Updated:  09/05/20 0707     Ionized Calcium 4.82 mg/dL      Collected by [id number]     Comment: Meter: [id number]     :  [id number]       Extra Tubes [[id number]] Collected:  09/05/20 0551    Specimen:  Blood, Venous Line Updated:  09/05/20 0700    Narrative:       The following orders were created for panel order Extra Tubes.  Procedure                                Abnormality         Status                     ---------                               -----------         ------                     Lavender Top[[id number]]                                     Final result                 Please view results for these tests on the individual orders.    Lavmasha Top [[id number]] Collected:  09/05/20 0551    Specimen:  Blood Updated:  09/05/20 0700     Extra Tube hold for add-on     Comment: Auto resulted       POC Glucose Once [[id number]]  (Normal) Collected:  09/05/20 0644    Specimen:  Blood Updated:  09/05/20 0657     Glucose 109 mg/dL      Comment: : [id number] Johnson JenniferMeter ID: [id number]       Comprehensive Metabolic Panel [[id number]]  (Abnormal) Collected:  09/05/20 0551    Specimen:  Blood Updated:  09/05/20 0648     Glucose 122 mg/dL      BUN 5 mg/dL      Creatinine 0.43 mg/dL      Sodium 138 mmol/L      Potassium 3.1 mmol/L      Chloride 103 mmol/L      CO2 18.0 mmol/L      Calcium 9.4 mg/dL      Total Protein 7.0 g/dL      Albumin 3.90 g/dL      ALT (SGPT) 32 U/L      AST (SGOT) 31 U/L      Alkaline Phosphatase 133 U/L      Total Bilirubin 1.0 mg/dL      eGFR  African Amer >150 mL/min/1.73      Globulin 3.1 gm/dL      A/G Ratio 1.3 g/dL      BUN/Creatinine Ratio 11.6     Anion Gap 17.0 mmol/L     Narrative:       GFR Normal >60  Chronic Kidney Disease <60  Kidney Failure <15      Magnesium [[id number]]  (Normal) Collected:  09/05/20 0551    Specimen:  Blood Updated:  09/05/20 0648     Magnesium 2.1 mg/dL     Phosphorus [[id number]]  (Normal) Collected:  09/05/20 0551    Specimen:  Blood Updated:  09/05/20 0648     Phosphorus 2.6 mg/dL     Prealbumin [[id number]]  (Abnormal) Collected:  09/04/20 1648    Specimen:  Blood Updated:  09/05/20 0143     Prealbumin 17.9 mg/dL     POC Glucose Once [[id number]]  (Normal) Collected:  09/05/20 0001    Specimen:  Blood Updated:  09/05/20 0018     Glucose 97 mg/dL      Comment: : [id number] Johnson  Guidoer ID: [id number]       Calcium, Ionized [[id number]] Collected:  09/04/20 1815    Specimen:  Blood Updated:  09/04/20 1838     Ionized Calcium 4.84 mg/dL      Collected by [id number]     Comment: Meter: [id number]     :  [id number]       POC Glucose Once [[id number]]  (Normal) Collected:  09/04/20 1807    Specimen:  Blood Updated:  09/04/20 1821     Glucose 97 mg/dL      Comment: : [id number] Penelope Burnett ID: [id number]       Comprehensive Metabolic Panel [[id number]]  (Abnormal) Collected:  09/04/20 1648    Specimen:  Blood Updated:  09/04/20 1714     Glucose 93 mg/dL      BUN 4 mg/dL      Creatinine 0.40 mg/dL      Sodium 137 mmol/L      Potassium 3.7 mmol/L      Chloride 101 mmol/L      CO2 18.0 mmol/L      Calcium 9.2 mg/dL      Total Protein 7.0 g/dL      Albumin 3.90 g/dL      ALT (SGPT) 27 U/L      AST (SGOT) 26 U/L      Alkaline Phosphatase 127 U/L      Total Bilirubin 1.0 mg/dL      eGFR  African Amer >150 mL/min/1.73      Globulin 3.1 gm/dL      A/G Ratio 1.3 g/dL      BUN/Creatinine Ratio 10.0     Anion Gap 18.0 mmol/L     Narrative:       GFR Normal >60  Chronic Kidney Disease <60  Kidney Failure <15      Magnesium [[id number]]  (Normal) Collected:  09/04/20 1648    Specimen:  Blood Updated:  09/04/20 1714     Magnesium 2.0 mg/dL     Phosphorus [[id number]]  (Normal) Collected:  09/04/20 1648    Specimen:  Blood Updated:  09/04/20 1714     Phosphorus 2.5 mg/dL     Cholesterol, Total [[id number]]  (Normal) Collected:  09/04/20 1648    Specimen:  Blood Updated:  09/04/20 1714     Total Cholesterol 197 mg/dL     Narrative:       Cholesterol Reference Ranges    (U.S. Department fo Health and Human Services ATP III Classifications)    Desirable        <200 mg/dl  Borderline High  200-239 mg/dl  High Risk        >240 mg/dl    Triglycerides [[id number]]  (Abnormal) Collected:  09/04/20 1648    Specimen:  Blood Updated:  09/04/20 1714     Triglycerides 171 mg/dL     C-reactive Protein  [[id number]]  (Abnormal) Collected:  09/04/20 1648    Specimen:  Blood Updated:  09/04/20 1714     C-Reactive Protein 1.47 mg/dL           Imaging Results (Last 72 Hours)     ** No results found for the last 72 hours. **        Orders (last 72 hrs)      Start     Ordered    09/07/20 1546  Comprehensive Metabolic Panel  Every Monday 09/04/20 1547    09/07/20 1546  C-reactive Protein  Every Monday 09/04/20 1547    09/07/20 1546  Prealbumin  Every Monday 09/04/20 1547    09/07/20 1546  Calcium, Ionized  Every Monday 09/04/20 1547    09/07/20 1546  Triglycerides  Every Monday 09/04/20 1547    09/07/20 0600  Magnesium  Every Other Day      09/04/20 1547    09/07/20 0600  Phosphorus  Every Other Day      09/04/20 1547    09/06/20 1030  metoclopramide (REGLAN) injection 10 mg  Every 8 Hours      09/06/20 0935    09/06/20 0936  NPO Diet NPO Except: Ice chips  Diet Effective Now      09/06/20 0935    09/06/20 0848  Calcium, Ionized  Once      09/06/20 0819    09/06/20 0638  POC Glucose Once  Once      09/06/20 0625    09/06/20 0600  CBC Auto Differential  PROCEDURE ONCE      09/06/20 0001    09/06/20 0033  POC Glucose Once  Once      09/06/20 0020    09/05/20 1818  POC Glucose Once  Once      09/05/20 1756    09/05/20 1800  Adult Standard Central TPN  Every 24 Hours Scheduled (TPN)      09/04/20 1549    09/05/20 1741  Bolus 200 LR now  [facility] Nursing Order (Specify)  Once     Comments:  Bolus 200 LR now    09/05/20 1741    09/05/20 1439  Inpatient Case Management  Consult  Once     Provider:  (Not yet assigned)    09/05/20 1439    09/05/20 1333  Inpatient Case Management  Consult  Once,   Status:  Canceled     Provider:  (Not yet assigned)    09/05/20 1333    09/05/20 1245  lactated ringers infusion  Continuous      09/05/20 1149    09/05/20 1223  POC Glucose Once  Once      09/05/20 1200 09/05/20 0900  pantoprazole (PROTONIX) EC tablet 40 mg  Every 12 Hours Scheduled       09/05/20 0716    09/05/20 0900  pantoprazole (PROTONIX) injection 40 mg  Every 12 Hours Scheduled      09/05/20 0716    09/05/20 0717  CBC & Differential  Daily      09/05/20 0716    09/05/20 0717  CBC Auto Differential  PROCEDURE ONCE      09/05/20 0716    09/05/20 0716  Code Status and Medical Interventions:  Continuous      09/05/20 0716    09/05/20 0712  metoclopramide (REGLAN) injection 10 mg  Every 6 Hours PRN,   Status:  Discontinued      09/05/20 0716    09/05/20 0708  Calcium, Ionized  Once      09/05/20 0551    09/05/20 0658  POC Glucose Once  Once      09/05/20 0644    09/05/20 0657  Extra Tubes  Once      09/05/20 0656    09/05/20 0657  Lavender Top  PROCEDURE ONCE      09/05/20 0656    09/05/20 0019  POC Glucose Once  Once      09/05/20 0001    09/04/20 2149  promethazine (PHENERGAN) suppository 25 mg  Every 6 Hours PRN      09/04/20 2150    09/04/20 1839  Calcium, Ionized  Once      09/04/20 1815    09/04/20 1822  POC Glucose Once  Once      09/04/20 1807    09/04/20 1800  POC Glucose Q6H  Every 6 Hours      09/04/20 1547    09/04/20 1800  Fat Emulsion Plant Based (INTRALIPID,LIPOSYN) 20 % infusion 50 g  Every 24 Hours Scheduled (TPN)      09/04/20 1547    09/04/20 1800  Fetal Nonstress Test  2 Times Daily      09/04/20 1612    09/04/20 1700  lidocaine PF 1% (XYLOCAINE) injection 1 mL  Once      09/04/20 1612    09/04/20 1607  Transfer pt to 2A.  Continue same orders  Misc Nursing Order (Specify)  Once     Comments:  Transfer pt to 2A.  Continue same orders    09/04/20 1607    09/04/20 1548  Daily Weights  Daily      09/04/20 1547    09/04/20 1548  Comprehensive Metabolic Panel  Daily      09/04/20 1547    09/04/20 1548  Magnesium  Daily      09/04/20 1547    09/04/20 1548  Phosphorus  Daily      09/04/20 1547    09/04/20 1548  Calcium, Ionized  Daily      09/04/20 1547    09/04/20 1546  Strict Intake and Output  Every Shift      09/04/20 1547    09/04/20 1546  TPN Port Instructions - Use Virgin /  Dedicated Port. Do NOT Draw Blood or Inject Medications Through This Port  Continuous      09/04/20 1547    09/04/20 1546  Infuse TPN Through a Central Line  Continuous      09/04/20 1547    09/04/20 1546  Change Tubing  Per Order Details     Comments:  1) Change Lipid Tubing Daily, Unless Lipid Emulsion Not Infusing Continuously, Then Change Lipid Tubing With Every Bottle Change. 2) Change Other IV Tubing & Cassette Every 72 Hours.    09/04/20 1547    09/04/20 1546  Change Dressing Over Catheter Insertion Site  Per Order Details     Comments:  Every 7 Days or PRN When Loose or Soiled    09/04/20 1547    09/04/20 1546  If TPN Stopped Abruptly, Immediately Hang D10W & Notify Provider  Continuous      09/04/20 1547    09/04/20 1546  NPO Diet  Diet Effective Now,   Status:  Canceled      09/04/20 1547    09/04/20 1546  NPO Diet  Diet Effective Now,   Status:  Canceled      09/04/20 1547    09/04/20 1546  Prealbumin  Once      09/04/20 1547    09/04/20 1546  Cholesterol, Total  Once      09/04/20 1547    09/04/20 1546  Triglycerides  Once      09/04/20 1547    09/04/20 1546  C-reactive Protein  Once      09/04/20 1547    09/04/20 1509  Nutrition Consult  Once     Provider:  (Not yet assigned)    09/04/20 1508    09/04/20 1447  Admit To Obstetrics Inpatient  Once      09/04/20 1447    09/04/20 1440  Insert PICC Using Ultrasound Guidance  Once     Provider:  (Not yet assigned)    09/04/20 1446    09/04/20 1440  No Dilantin Through PICC  Continuous      09/04/20 1446    09/04/20 1436  Verify Informed Consent for PICC Line Placement  Once      09/04/20 1446    09/04/20 0100  Adult Standard Central TPN  Every 24 Hours Scheduled (TPN),   Status:  Discontinued      09/04/20 1547                   Physician Progress Notes (last 72 hours) (Notes from 09/03/20 1003 through 09/06/20 1003)      Yuri Gonzales MD at 09/06/20 0719           Riki Delacruz  Don  : 2000  MRN: [id number]  CSN: [id number]    Antepartum Progress Note    Subjective   21 yo ` at 30w1d who was transferred from [place] for hyperalimentation secondary to hyperemesis from reflux and gastroparesis. Patient denies any emesis yesterday.     Objective     Min/max vitals past 24 hours:   Temp  Min: 97.6 °F (36.4 °C)  Max: 98.1 °F (36.7 °C)  BP  Min: 112/72  Max: 125/67  Pulse  Min: 98  Max: 113  Resp  Min: 16  Max: 20         General: well developed; well nourished  no acute distress   Heart: Not performed.   Lungs: breathing is unlabored   Abdomen: soft, non-tender; no masses  no umbilical or inguinal hernias are present  no hepato-splenomegaly  uterus 30 week size   FHT's: reactive and category 1.  external monitors used   Cervix: was not checked.   Contractions: rare resolved with hydration   Back: bilateral CVA tenderness is absent          Assessment   1. IUP at 31w1d  2. Hyperemesis, dehydration,reflux, and gastroparesis    Plan   1. Continue parenteral hyperalimentation, NPO per Dr Alex Gonzales MD  2020  07:20             Electronically signed by Yuri Gonzales MD at 20 0727     Yuri Gonzales MD at 20 0706          [facility]    Nubia Ochoa  : 2000  MRN: [id number]  CSN: [id number]    Antepartum Progress Note    Subjective   21 yo  at 30 weeks with severe hyperemesis and failed conservative management. Emesis times 3 last evening, better with phenergan supp. Patient complains of being very thirsty.    Objective     Min/max vitals past 24 hours:   Temp  Min: 97.4 °F (36.3 °C)  Max: 98.4 °F (36.9 °C)  BP  Min: 109/62  Max: 126/76  Pulse  Min: 80  Max: 121  Resp  Min: 16  Max: 18         General: well developed; well nourished  no acute distress   Heart: Not performed.   Lungs: breathing is unlabored   Abdomen: soft, non-tender; no masses  no umbilical or inguinal hernias are present  no hepato-splenomegaly  fundus  firm and non-tender   FHT's: reactive and category 1.  external monitors used   Cervix: was not checked.   Contractions: none   Back: bilateral CVA tenderness is absent          Assessment   3. IUP at 31w0d  4. Hyperemesis with severe gastric reflux and gastroparesis    Plan   2. NPO, parenteral hydration, fetal monitoring    Yuri Gonzales MD  2020  07:07             Electronically signed by Yuri Gonzales MD at 20 0711     Sebastian Westbrook MD at 20 1531        [facility] note    Multiple conversations about this patient over the last few weeks    Nubia is a 20yo   Currently 30 6/7 weeks  Prenatal care with Dr Foreman at [facility]    Has had worsening reflux x weeks  Worsened about 2 weeks ago  Has had multiple evaluations at [facility] as well as a 3 day admission secondary to persistent pain, reflux and vomiting. Was going to have in my office yesterday, but wanted the upper GI evaluation and that needed a covid test performed prior (which was done, negative).     Prenatal care otherwise without issue    Primarily benefited from last admission from being npo. Efforts to reintroduce any nutrients since that admission have been met with vomiting including projectile vomiting.     Arranged a limited upper GI evaluation.   Results--Severe intermittent gastroesophageal reflux with gastroparesis. No  mechanical gastric obstruction. No duodenal dilatation or malrotation.    Secondary to her significant symptomatology, the degree of reflux and gastroparesis and  gestational age, I recommend TPN be started and for Nubia to be npo for a prolonged period of time    Unable to get PICC line at [facility].   Discussed with Dr Christian Delacruz to be admitted to [facility] for:  Npo  reglan  protonix  PICC line placement  Dietary consultation with the initiation of TPN (standard formulas)  Plan will be for home TPN therefore will need to have discharge planning/coordination of this service.      As always, if there  are any questions/concerns, please do not hesitate to contact me.   Thanks  Nish  [phone]        Electronically signed by Sebastian Westbrook MD at 09/04/20 1539       Consult Notes (last 72 hours) (Notes from 09/03/20 1003 through 09/06/20 1003)    No notes of this type exist for this encounter.       Tracey Liao, RN   Registered Nurse      Plan of Care   Signed   Date of Service:  09/06/20 0601   Creation Time:  09/06/20 0601            Signed             Show:Clear all  []Manual[x]Template[]Copied    Added by:  [x]Tracey Liao, RN    []Hover for details     Problem: Patient Care Overview  Goal: Plan of Care Review  Outcome: Ongoing (interventions implemented as appropriate)  Flowsheets (Taken 9/6/2020 0529)  Progress: improving  Plan of Care Reviewed With: patient  Note:   Continue with POC. Patient's HR remains in the low 100s during shift. Patient c/o of burning in her chest, IV protonix given, encouraged to take deep breathes and to sit up.  TPN/Lipids and LR continue infusing thru PICC. Emesis x1 this shift. Pt. States she feels better. Patient more talkative and in better mood. NST completed per LDR and reactive. Pt hoping to have something to drink today.      Problem: Hyperemesis Gravidarum (Adult,Obstetrics,Pediatric)  Goal: Signs and Symptoms of Listed Potential Problems Will be Absent, Minimized or Managed (Hyperemesis Gravidarum)  Outcome: Ongoing (interventions implemented as appropriate)     Problem: Nutrition, Parenteral (Adult)  Goal: Signs and Symptoms of Listed Potential Problems Will be Absent, Minimized or Managed (Nutrition, Parenteral)  Outcome: Ongoing (interventions implemented as appropriate)

## 2020-09-06 NOTE — NON STRESS TEST
Nubia Ochoa, a  at 31w1d with an CARMEN of 2020, by Patient Reported, was seen at [facility] 2A for a nonstress test.    Chief Complaint   Patient presents with   • Other     HERE FOR TPN AND PICC LINE PLACEMENT       Patient Active Problem List   Diagnosis   • Hyperemesis affecting pregnancy, antepartum       Start Time: 0509  Stop Time: 05

## 2020-09-06 NOTE — PLAN OF CARE
Problem: Patient Care Overview  Goal: Plan of Care Review  Outcome: Ongoing (interventions implemented as appropriate)  Flowsheets (Taken 9/6/2020 1726)  Progress: improving  Plan of Care Reviewed With: patient  Outcome Summary: VSS. Voiding; Ambulating in room; Denies n/v this shift; has had ice chips; pt also reports that she cannot lie but she has also had a few sips of Gatorade et denies any nausea/vomiting and also less need to spit afterward - education on avoiding po intake for GI rest other than a few ice chips.  Encouraged to just swish and spit water as pt states she cannot stand to use the mouth swabs.  PICC line with TPN et LR infusing.  Will initiate lipids again this evening.  Goal: Individualization and Mutuality  Outcome: Ongoing (interventions implemented as appropriate)  Flowsheets (Taken 9/6/2020 1726)  Patient Specific Goals (Include Timeframe): Discharge home as soon as possible  What Anxieties, Fears, Concerns, or Questions Do You Have About Your Care?: Pt wants to be able to drink fluids as desires and go home. education on GI rest has been provided.  Goal: Discharge Needs Assessment  Outcome: Ongoing (interventions implemented as appropriate)  Flowsheets (Taken 9/6/2020 1726)  Concerns Comments: Case management involved for TPN outpatient.  Concerns to be Addressed: other (see comments)  Readmission Within the Last 30 Days: no previous admission in last 30 days  Goal: Interprofessional Rounds/Family Conf  Outcome: Ongoing (interventions implemented as appropriate)     Problem: Hyperemesis Gravidarum (Adult,Obstetrics,Pediatric)  Goal: Signs and Symptoms of Listed Potential Problems Will be Absent, Minimized or Managed (Hyperemesis Gravidarum)  Outcome: Ongoing (interventions implemented as appropriate)  Flowsheets (Taken 9/6/2020 1726)  Problems Assessed (Nausea/Vomiting/Hyperemesis in Pregnancy): all  Problems Present (Hyperemesis): situational response  Note:   Reflux et gastroparesis.      Problem: Nutrition, Parenteral (Adult)  Goal: Signs and Symptoms of Listed Potential Problems Will be Absent, Minimized or Managed (Nutrition, Parenteral)  Outcome: Ongoing (interventions implemented as appropriate)  Flowsheets (Taken 9/6/2020 8116)  Problems Assessed (Parenteral Nutrition): all  Problems Present (Parenteral Nutrition): fluid/electrolyte imbalance

## 2020-09-06 NOTE — PROGRESS NOTES
Continued Stay Note   Riki     Patient Name: Nubia Ochoa  MRN: [id number]  Today's Date: 9/6/2020    Admit Date: 9/4/2020    Discharge Plan     Row Name 09/06/20 1031       Plan    Plan Comments  SW spoke with Robert from [facility] this AM in regards to TPN. [facility] is needing exact recipe for TPN and Lipids. Dietician has been contacted to see if this can be obtained. Pt has chosen to use [facility]  at discharge. [facility] will not be able to supply this med until Tuesday due to holiday weekend.  Pt aware of this. SW will await for message from Dietician to determine if recipes can be obtained.         Discharge Codes    No documentation.             Marquita Cantrell

## 2020-09-06 NOTE — NON STRESS TEST
Nubia Ochoa, a  at 31w1d with an CARMEN of 2020, by Patient Reported, was seen at [facility] 2A for a nonstress test.    Chief Complaint   Patient presents with   • Other     HERE FOR TPN AND PICC LINE PLACEMENT       Patient Active Problem List   Diagnosis   • Hyperemesis affecting pregnancy, antepartum       Start Time: 1203  Stop Time: 1227    Interpretation A  Nonstress Test Interpretation A: Reactive (20 1233 : Jennifer Lee, RN)  Comments A: verified with jennifer lee rn/paul singh (20 1233 : Jennifer Lee, RN)

## 2020-09-06 NOTE — PROGRESS NOTES
Riki Ochoa  : 2000  MRN: [id number]  CSN: [id number]    Antepartum Progress Note    Subjective   19 yo ` at 30w1d who was transferred from [place] for hyperalimentation secondary to hyperemesis from reflux and gastroparesis. Patient denies any emesis yesterday.      Objective     Min/max vitals past 24 hours:   Temp  Min: 97.6 °F (36.4 °C)  Max: 98.1 °F (36.7 °C)  BP  Min: 112/72  Max: 125/67  Pulse  Min: 98  Max: 113  Resp  Min: 16  Max: 20         General: well developed; well nourished  no acute distress   Heart: Not performed.   Lungs: breathing is unlabored   Abdomen: soft, non-tender; no masses  no umbilical or inguinal hernias are present  no hepato-splenomegaly  uterus 30 week size   FHT's: reactive and category 1.  external monitors used   Cervix: was not checked.   Contractions: rare resolved with hydration   Back: bilateral CVA tenderness is absent           Assessment   1. IUP at 31w1d  2. Hyperemesis, dehydration,reflux, and gastroparesis     Plan   1. Continue parenteral hyperalimentation, NPO per Dr Alex Gonzales MD  2020  07:20

## 2020-09-06 NOTE — NON STRESS TEST
Nubia Ochoa, a  at 31w1d with an CARMEN of 2020, by Patient Reported, was seen at [facility]  for a nonstress test.    Chief Complaint   Patient presents with   • Other     HERE FOR TPN AND PICC LINE PLACEMENT       Patient Active Problem List   Diagnosis   • Hyperemesis affecting pregnancy, antepartum       Start Time: 0509  Stop Time: 0530    Interpretation A  Nonstress Test Interpretation A: Reactive (20 : Alejandra Martinez, RN)  Comments A: Verified by PRAFUL Martinez RN and JOSÉ LUIS Rubio RN (20 : Alejandra Martinez, RN)

## 2020-09-06 NOTE — PLAN OF CARE
Problem: Patient Care Overview  Goal: Plan of Care Review  Outcome: Ongoing (interventions implemented as appropriate)  Flowsheets (Taken 9/6/2020 6186)  Progress: improving  Plan of Care Reviewed With: patient  Note:   Continue with POC. Patient's HR remains in the low 100s during shift. Patient c/o of burning in her chest, IV protonix given, encouraged to take deep breathes and to sit up.  TPN/Lipids and LR continue infusing thru PICC. Emesis x1 this shift. Pt. States she feels better. Patient more talkative and in better mood. NST completed per LDR and reactive. Pt hoping to have something to drink today.      Problem: Hyperemesis Gravidarum (Adult,Obstetrics,Pediatric)  Goal: Signs and Symptoms of Listed Potential Problems Will be Absent, Minimized or Managed (Hyperemesis Gravidarum)  Outcome: Ongoing (interventions implemented as appropriate)     Problem: Nutrition, Parenteral (Adult)  Goal: Signs and Symptoms of Listed Potential Problems Will be Absent, Minimized or Managed (Nutrition, Parenteral)  Outcome: Ongoing (interventions implemented as appropriate)

## 2020-09-07 LAB
ALBUMIN SERPL-MCNC: 3.2 G/DL (ref 3.5–5.2)
ALBUMIN/GLOB SERPL: 1.2 G/DL
ALP SERPL-CCNC: 116 U/L (ref 39–117)
ALT SERPL W P-5'-P-CCNC: 31 U/L (ref 1–33)
ANION GAP SERPL CALCULATED.3IONS-SCNC: 11 MMOL/L (ref 5–15)
AST SERPL-CCNC: 29 U/L (ref 1–32)
BASOPHILS # BLD AUTO: 0.03 10*3/MM3 (ref 0–0.2)
BASOPHILS NFR BLD AUTO: 0.5 % (ref 0–1.5)
BILIRUB SERPL-MCNC: 1.6 MG/DL (ref 0–1.2)
BUN SERPL-MCNC: 8 MG/DL (ref 6–20)
BUN/CREAT SERPL: 32 (ref 7–25)
CA-I BLD-MCNC: 4.5 MG/DL (ref 4.6–5.4)
CALCIUM SPEC-SCNC: 8.3 MG/DL (ref 8.6–10.5)
CHLORIDE SERPL-SCNC: 106 MMOL/L (ref 98–107)
CO2 SERPL-SCNC: 23 MMOL/L (ref 22–29)
CREAT SERPL-MCNC: 0.25 MG/DL (ref 0.57–1)
CRP SERPL-MCNC: 1.1 MG/DL (ref 0–0.5)
DEPRECATED RDW RBC AUTO: 40.4 FL (ref 37–54)
EOSINOPHIL # BLD AUTO: 0.1 10*3/MM3 (ref 0–0.4)
EOSINOPHIL NFR BLD AUTO: 1.7 % (ref 0.3–6.2)
ERYTHROCYTE [DISTWIDTH] IN BLOOD BY AUTOMATED COUNT: 13.2 % (ref 12.3–15.4)
GFR SERPL CREATININE-BSD FRML MDRD: >150 ML/MIN/1.73
GLOBULIN UR ELPH-MCNC: 2.6 GM/DL
GLUCOSE BLDC GLUCOMTR-MCNC: 100 MG/DL (ref 70–130)
GLUCOSE BLDC GLUCOMTR-MCNC: 106 MG/DL (ref 70–130)
GLUCOSE BLDC GLUCOMTR-MCNC: 107 MG/DL (ref 70–130)
GLUCOSE BLDC GLUCOMTR-MCNC: 86 MG/DL (ref 70–130)
GLUCOSE SERPL-MCNC: 112 MG/DL (ref 65–99)
HCT VFR BLD AUTO: 29.9 % (ref 34–46.6)
HGB BLD-MCNC: 10.3 G/DL (ref 12–15.9)
IMM GRANULOCYTES # BLD AUTO: 0.05 10*3/MM3 (ref 0–0.05)
IMM GRANULOCYTES NFR BLD AUTO: 0.8 % (ref 0–0.5)
LYMPHOCYTES # BLD AUTO: 1.24 10*3/MM3 (ref 0.7–3.1)
LYMPHOCYTES NFR BLD AUTO: 20.7 % (ref 19.6–45.3)
Lab: ABNORMAL
MAGNESIUM SERPL-MCNC: 1.9 MG/DL (ref 1.6–2.6)
MCH RBC QN AUTO: 29.3 PG (ref 26.6–33)
MCHC RBC AUTO-ENTMCNC: 34.4 G/DL (ref 31.5–35.7)
MCV RBC AUTO: 85.2 FL (ref 79–97)
MONOCYTES # BLD AUTO: 0.53 10*3/MM3 (ref 0.1–0.9)
MONOCYTES NFR BLD AUTO: 8.9 % (ref 5–12)
NEUTROPHILS NFR BLD AUTO: 4.03 10*3/MM3 (ref 1.7–7)
NEUTROPHILS NFR BLD AUTO: 67.4 % (ref 42.7–76)
NRBC BLD AUTO-RTO: 0 /100 WBC (ref 0–0.2)
PHOSPHATE SERPL-MCNC: 3.5 MG/DL (ref 2.5–4.5)
PLATELET # BLD AUTO: 260 10*3/MM3 (ref 140–450)
PMV BLD AUTO: 10.7 FL (ref 6–12)
POTASSIUM SERPL-SCNC: 3.2 MMOL/L (ref 3.5–5.2)
PREALB SERPL-MCNC: 16.7 MG/DL (ref 20–40)
PROT SERPL-MCNC: 5.8 G/DL (ref 6–8.5)
RBC # BLD AUTO: 3.51 10*6/MM3 (ref 3.77–5.28)
SODIUM SERPL-SCNC: 140 MMOL/L (ref 136–145)
TRIGL SERPL-MCNC: 118 MG/DL (ref 0–150)
WBC # BLD AUTO: 5.98 10*3/MM3 (ref 3.4–10.8)

## 2020-09-07 PROCEDURE — 84100 ASSAY OF PHOSPHORUS: CPT | Performed by: OBSTETRICS & GYNECOLOGY

## 2020-09-07 PROCEDURE — 80053 COMPREHEN METABOLIC PANEL: CPT | Performed by: OBSTETRICS & GYNECOLOGY

## 2020-09-07 PROCEDURE — 86140 C-REACTIVE PROTEIN: CPT | Performed by: OBSTETRICS & GYNECOLOGY

## 2020-09-07 PROCEDURE — 85025 COMPLETE CBC W/AUTO DIFF WBC: CPT | Performed by: OBSTETRICS & GYNECOLOGY

## 2020-09-07 PROCEDURE — 82330 ASSAY OF CALCIUM: CPT

## 2020-09-07 PROCEDURE — 25010000002 METOCLOPRAMIDE PER 10 MG: Performed by: OBSTETRICS & GYNECOLOGY

## 2020-09-07 PROCEDURE — 84134 ASSAY OF PREALBUMIN: CPT | Performed by: OBSTETRICS & GYNECOLOGY

## 2020-09-07 PROCEDURE — 82962 GLUCOSE BLOOD TEST: CPT

## 2020-09-07 PROCEDURE — 83735 ASSAY OF MAGNESIUM: CPT | Performed by: OBSTETRICS & GYNECOLOGY

## 2020-09-07 PROCEDURE — 59025 FETAL NON-STRESS TEST: CPT

## 2020-09-07 PROCEDURE — 84478 ASSAY OF TRIGLYCERIDES: CPT | Performed by: OBSTETRICS & GYNECOLOGY

## 2020-09-07 RX ADMIN — ASCORBIC ACID, VITAMIN A PALMITATE, CHOLECALCIFEROL, THIAMINE HYDROCHLORIDE, RIBOFLAVIN-5 PHOSPHATE SODIUM, PYRIDOXINE HYDROCHLORIDE, NIACINAMIDE, DEXPANTHENOL, ALPHA-TOCOPHEROL ACETATE, VITAMIN K1, FOLIC ACID, BIOTIN, CYANOCOBALAMIN: 200; 3300; 200; 6; 3.6; 6; 40; 15; 10; 150; 600; 60; 5 INJECTION, SOLUTION INTRAVENOUS at 17:38

## 2020-09-07 RX ADMIN — SODIUM CHLORIDE, POTASSIUM CHLORIDE, SODIUM LACTATE AND CALCIUM CHLORIDE 105 ML/HR: 600; 310; 30; 20 INJECTION, SOLUTION INTRAVENOUS at 19:30

## 2020-09-07 RX ADMIN — METOCLOPRAMIDE 10 MG: 5 INJECTION, SOLUTION INTRAMUSCULAR; INTRAVENOUS at 10:33

## 2020-09-07 RX ADMIN — SODIUM CHLORIDE, POTASSIUM CHLORIDE, SODIUM LACTATE AND CALCIUM CHLORIDE 105 ML/HR: 600; 310; 30; 20 INJECTION, SOLUTION INTRAVENOUS at 02:12

## 2020-09-07 RX ADMIN — PANTOPRAZOLE SODIUM 40 MG: 40 INJECTION, POWDER, LYOPHILIZED, FOR SOLUTION INTRAVENOUS at 08:09

## 2020-09-07 RX ADMIN — METOCLOPRAMIDE 10 MG: 5 INJECTION, SOLUTION INTRAMUSCULAR; INTRAVENOUS at 02:15

## 2020-09-07 RX ADMIN — I.V. FAT EMULSION 50 G: 20 EMULSION INTRAVENOUS at 17:39

## 2020-09-07 RX ADMIN — METOCLOPRAMIDE 10 MG: 5 INJECTION, SOLUTION INTRAMUSCULAR; INTRAVENOUS at 17:52

## 2020-09-07 RX ADMIN — PANTOPRAZOLE SODIUM 40 MG: 40 INJECTION, POWDER, LYOPHILIZED, FOR SOLUTION INTRAVENOUS at 21:02

## 2020-09-07 NOTE — PROGRESS NOTES
Beverlyrohini Ochoa  : 2000  MRN: [id number]  CSN: [id number]    Antepartum Progress Note    Subjective   19yo  at 31w2d admitted with hyperemesis and dehydration secondary to gastric reflux and gastroparesis.Yesterday since patient had had no emesis she was given ice chips, but she advanced to full liquids off the floor and had no nausea or vomiting. She is feeling much better, signs of depression has resolved.     Objective     Min/max vitals past 24 hours:   Temp  Min: 97.8 °F (36.6 °C)  Max: 98.4 °F (36.9 °C)  BP  Min: 106/68  Max: 116/63  Pulse  Min: 96  Max: 107  Resp  Min: 16  Max: 18         General: well developed; well nourished  no acute distress   Heart: Not performed.   Lungs: breathing is unlabored   Abdomen: soft, non-tender; no masses  no umbilical or inguinal hernias are present  no hepato-splenomegaly   FHT's: reactive and category 1.  external monitors used   Cervix: was not checked.   Contractions: rare   Back: bilateral CVA tenderness is absent           Assessment   1. IUP at 31w2d  2. Hyperemesis, dehydration, reflux and gastroparesis.     Plan   1. Continue current course, possibly advance diet to bland (gallbladder sludge on previous ultrasound)    Yuri Gonzales MD  2020  07:14

## 2020-09-07 NOTE — NON STRESS TEST
Nubia Ochoa, a  at 31w1d with an CARMEN of 2020, by Patient Reported, was seen at [facility]  for a nonstress test.    Chief Complaint   Patient presents with   • Other     HERE FOR TPN AND PICC LINE PLACEMENT       Patient Active Problem List   Diagnosis   • Hyperemesis affecting pregnancy, antepartum       Start Time:   Stop Time:     Interpretation A  Nonstress Test Interpretation A: Reactive (20 : Lashonda Gan, RN)  Comments A: Verified per HUSSEIN Gan RN and HERMANN Beaver RN (20 : Lashonda Gan, RN)

## 2020-09-07 NOTE — NON STRESS TEST
Nubia Ochoa, a  at 31w2d with an CARMEN of 2020, by Patient Reported, was seen at [facility]  for a nonstress test.    Chief Complaint   Patient presents with   • Other     HERE FOR TPN AND PICC LINE PLACEMENT       Patient Active Problem List   Diagnosis   • Hyperemesis affecting pregnancy, antepartum       Start Time: 824  Stop Time: 845    Interpretation A  Nonstress Test Interpretation A: Reactive (20 0859 : Bianca Mars, RN)  Comments A: verified by PK Mars, REJI and BECKY Fitzgerald RN (20 0859 : Bianca Mars, RN)

## 2020-09-07 NOTE — PLAN OF CARE
Problem: Patient Care Overview  Goal: Plan of Care Review  Outcome: Ongoing (interventions implemented as appropriate)  Flowsheets  Taken 9/6/2020 2100 by Yocasta Beckwith, RN  Plan of Care Reviewed With: patient  Taken 9/7/2020 1808 by Melanie Mayo, RN  Outcome Summary: VSS, no c/o pain, tolerating ice chips and some clear liquids, pt put on bland diet today but hasnt ate much, no vomiting or nausea but has hypersalivation, PICC line with TPN and lipids infusing, LR infusing, had visitors today, anxious to go home.

## 2020-09-08 ENCOUNTER — APPOINTMENT (OUTPATIENT)
Dept: ULTRASOUND IMAGING | Facility: HOSPITAL | Age: 20
End: 2020-09-08

## 2020-09-08 ENCOUNTER — HOSPITAL ENCOUNTER (OUTPATIENT)
Facility: HOSPITAL | Age: 20
End: 2020-09-08
Attending: OBSTETRICS & GYNECOLOGY | Admitting: OBSTETRICS & GYNECOLOGY

## 2020-09-08 VITALS
SYSTOLIC BLOOD PRESSURE: 103 MMHG | HEIGHT: 63 IN | OXYGEN SATURATION: 97 % | WEIGHT: 170.2 LBS | BODY MASS INDEX: 30.16 KG/M2 | RESPIRATION RATE: 18 BRPM | TEMPERATURE: 98.1 F | HEART RATE: 110 BPM | DIASTOLIC BLOOD PRESSURE: 55 MMHG

## 2020-09-08 PROBLEM — E86.0 DEHYDRATION DURING PREGNANCY: Status: ACTIVE | Noted: 2020-09-08

## 2020-09-08 PROBLEM — K31.84 GASTROPARESIS: Status: ACTIVE | Noted: 2020-09-08

## 2020-09-08 PROBLEM — K21.9 GASTRIC REFLUX SYNDROME: Status: ACTIVE | Noted: 2020-09-08

## 2020-09-08 PROBLEM — O99.280 DEHYDRATION DURING PREGNANCY: Status: ACTIVE | Noted: 2020-09-08

## 2020-09-08 PROBLEM — O99.019 MATERNAL ANEMIA IN PREGNANCY, ANTEPARTUM: Status: ACTIVE | Noted: 2020-09-08

## 2020-09-08 LAB
ALBUMIN SERPL-MCNC: 3.1 G/DL (ref 3.5–5.2)
ALBUMIN/GLOB SERPL: 1.3 G/DL
ALP SERPL-CCNC: 117 U/L (ref 39–117)
ALT SERPL W P-5'-P-CCNC: 29 U/L (ref 1–33)
AMYLASE SERPL-CCNC: 82 U/L (ref 28–100)
ANION GAP SERPL CALCULATED.3IONS-SCNC: 10 MMOL/L (ref 5–15)
AST SERPL-CCNC: 24 U/L (ref 1–32)
BASOPHILS # BLD AUTO: 0.04 10*3/MM3 (ref 0–0.2)
BASOPHILS NFR BLD AUTO: 0.6 % (ref 0–1.5)
BILIRUB SERPL-MCNC: 1.4 MG/DL (ref 0–1.2)
BUN SERPL-MCNC: 7 MG/DL (ref 6–20)
BUN/CREAT SERPL: 28 (ref 7–25)
CALCIUM SPEC-SCNC: 8.5 MG/DL (ref 8.6–10.5)
CHLORIDE SERPL-SCNC: 108 MMOL/L (ref 98–107)
CO2 SERPL-SCNC: 23 MMOL/L (ref 22–29)
CREAT SERPL-MCNC: 0.25 MG/DL (ref 0.57–1)
DEPRECATED RDW RBC AUTO: 41.3 FL (ref 37–54)
EOSINOPHIL # BLD AUTO: 0.08 10*3/MM3 (ref 0–0.4)
EOSINOPHIL NFR BLD AUTO: 1.2 % (ref 0.3–6.2)
ERYTHROCYTE [DISTWIDTH] IN BLOOD BY AUTOMATED COUNT: 13.1 % (ref 12.3–15.4)
GFR SERPL CREATININE-BSD FRML MDRD: >150 ML/MIN/1.73
GLOBULIN UR ELPH-MCNC: 2.4 GM/DL
GLUCOSE BLDC GLUCOMTR-MCNC: 102 MG/DL (ref 70–130)
GLUCOSE BLDC GLUCOMTR-MCNC: 106 MG/DL (ref 70–130)
GLUCOSE BLDC GLUCOMTR-MCNC: 109 MG/DL (ref 70–130)
GLUCOSE SERPL-MCNC: 118 MG/DL (ref 65–99)
HCT VFR BLD AUTO: 29.3 % (ref 34–46.6)
HGB BLD-MCNC: 9.9 G/DL (ref 12–15.9)
IMM GRANULOCYTES # BLD AUTO: 0.07 10*3/MM3 (ref 0–0.05)
IMM GRANULOCYTES NFR BLD AUTO: 1 % (ref 0–0.5)
LIPASE SERPL-CCNC: 40 U/L (ref 13–60)
LYMPHOCYTES # BLD AUTO: 1.05 10*3/MM3 (ref 0.7–3.1)
LYMPHOCYTES NFR BLD AUTO: 15.4 % (ref 19.6–45.3)
MCH RBC QN AUTO: 29.4 PG (ref 26.6–33)
MCHC RBC AUTO-ENTMCNC: 33.8 G/DL (ref 31.5–35.7)
MCV RBC AUTO: 86.9 FL (ref 79–97)
MONOCYTES # BLD AUTO: 0.58 10*3/MM3 (ref 0.1–0.9)
MONOCYTES NFR BLD AUTO: 8.5 % (ref 5–12)
NEUTROPHILS NFR BLD AUTO: 4.98 10*3/MM3 (ref 1.7–7)
NEUTROPHILS NFR BLD AUTO: 73.3 % (ref 42.7–76)
NRBC BLD AUTO-RTO: 0 /100 WBC (ref 0–0.2)
PLATELET # BLD AUTO: 238 10*3/MM3 (ref 140–450)
PMV BLD AUTO: 10.9 FL (ref 6–12)
POTASSIUM SERPL-SCNC: 3.2 MMOL/L (ref 3.5–5.2)
PROT SERPL-MCNC: 5.5 G/DL (ref 6–8.5)
RBC # BLD AUTO: 3.37 10*6/MM3 (ref 3.77–5.28)
SODIUM SERPL-SCNC: 141 MMOL/L (ref 136–145)
WBC # BLD AUTO: 6.8 10*3/MM3 (ref 3.4–10.8)

## 2020-09-08 PROCEDURE — 85025 COMPLETE CBC W/AUTO DIFF WBC: CPT | Performed by: OBSTETRICS & GYNECOLOGY

## 2020-09-08 PROCEDURE — 80053 COMPREHEN METABOLIC PANEL: CPT | Performed by: OBSTETRICS & GYNECOLOGY

## 2020-09-08 PROCEDURE — 25010000002 METOCLOPRAMIDE PER 10 MG: Performed by: OBSTETRICS & GYNECOLOGY

## 2020-09-08 PROCEDURE — 82150 ASSAY OF AMYLASE: CPT | Performed by: OBSTETRICS & GYNECOLOGY

## 2020-09-08 PROCEDURE — 76705 ECHO EXAM OF ABDOMEN: CPT

## 2020-09-08 PROCEDURE — 82962 GLUCOSE BLOOD TEST: CPT

## 2020-09-08 PROCEDURE — 83690 ASSAY OF LIPASE: CPT | Performed by: OBSTETRICS & GYNECOLOGY

## 2020-09-08 PROCEDURE — 59025 FETAL NON-STRESS TEST: CPT

## 2020-09-08 RX ADMIN — METOCLOPRAMIDE 10 MG: 5 INJECTION, SOLUTION INTRAMUSCULAR; INTRAVENOUS at 02:31

## 2020-09-08 RX ADMIN — SODIUM CHLORIDE, POTASSIUM CHLORIDE, SODIUM LACTATE AND CALCIUM CHLORIDE 105 ML/HR: 600; 310; 30; 20 INJECTION, SOLUTION INTRAVENOUS at 12:10

## 2020-09-08 RX ADMIN — PANTOPRAZOLE SODIUM 40 MG: 40 INJECTION, POWDER, LYOPHILIZED, FOR SOLUTION INTRAVENOUS at 09:20

## 2020-09-08 RX ADMIN — METOCLOPRAMIDE 10 MG: 5 INJECTION, SOLUTION INTRAMUSCULAR; INTRAVENOUS at 10:35

## 2020-09-08 NOTE — PLAN OF CARE
Problem: Patient Care Overview  Goal: Plan of Care Review  Outcome: Ongoing (interventions implemented as appropriate)  Flowsheets (Taken 9/8/2020 0647)  Progress: no change  Plan of Care Reviewed With: patient  Outcome Summary: VSS, no c/o pain. tolerating some sips of water. has not eaten any food. pt had 1 emesis last night after drinking apple juice with NST. hypersalivation continues. PICC line with TPN and LR infusing. bilirubin levels elevated (1.6). will repeat gallbladder u/s today.  Goal: Individualization and Mutuality  Outcome: Ongoing (interventions implemented as appropriate)  Flowsheets (Taken 9/8/2020 0647)  Patient Specific Goals (Include Timeframe): home as soon as possible  Patient Specific Preferences: no apple juice

## 2020-09-08 NOTE — DISCHARGE INSTR - APPOINTMENTS
An appointment has been scheduled with Maternal Fetal Medicine for Wednesday, September 9, 2020 at 8:30.     After that appointment, they are expecting you at [facility] for education about your Total Parenteral Nutrition.

## 2020-09-08 NOTE — PROGRESS NOTES
[facility]    Nubia Ochoa  : 2000  MRN: [id number]  CSN: [id number]    Antepartum Progress Note    Subjective   19 yo  at 31w3d admitted with severe hyperemesis, dehydration secondary to acid reflux and gastroparesis. She is tolerating liquids without any problems. She vomited once yesterday when she tried apple juice. She states she cannot tolerate apple juice it always makes her nauseated. She can tolerate yogart, broth, and some other foods.     Objective     Min/max vitals past 24 hours:   Temp  Min: 97.8 °F (36.6 °C)  Max: 98.9 °F (37.2 °C)  BP  Min: 124/84  Max: 144/65  Pulse  Min: 94  Max: 115  Resp  Min: 18  Max: 18         General: well developed; well nourished  no acute distress   Heart: Not performed.   Lungs: breathing is unlabored   Abdomen: soft, non-tender; no masses  no umbilical or inguinal hernias are present  no hepato-splenomegaly   FHT's: reactive and category 1.  external monitors used   Cervix: was not checked.   Contractions: none   Back: bilateral CVA tenderness is absent           Assessment   1. IUP at 31w3d  2. Hyperemesis, dehydration, gastric reflux and gastroparesis. Low potassium and rising bilirubin (0.5 to 1.0 to 1.6)               3. Sludge on ultrasound of gallbladder  Plan   1. Per Dr Westbrook. Recheck another ultrasound of her gallbladder and pancreas. Recheck lab CMP, amylase, and lipase               2. Dietary consult  Yuri Gonzales MD  2020  06:41        19yo  at 31w2d with severe hyperemesis

## 2020-09-08 NOTE — NON STRESS TEST
Nubia Ochoa, a  at 31w2d with an CARMEN of 2020, by Patient Reported, was seen at [facility] 2A for a nonstress test.    Chief Complaint   Patient presents with   • Other     HERE FOR TPN AND PICC LINE PLACEMENT       Patient Active Problem List   Diagnosis   • Hyperemesis affecting pregnancy, antepartum       Start Time:   Stop Time:     Interpretation A  Nonstress Test Interpretation A: Reactive (20 : Jailyn Chinchilla, RN)  Comments A: verified by HERMANN Chinchilla RN and LORIN Rushing RN (20 : Jailyn Chinchilla, RN)

## 2020-09-08 NOTE — PAYOR COMM NOTE
"REF: [id number]    [facility]  CANDICE,  [phone]  OR  FAX  [phone]      Ken Ochoa (20 y.o. Female)     Date of Birth Social Security Number Address Home Phone MRN    2000  [address] [place] [address] [phone] [id number]    [place] Marital Status          [place]        Admission Date Admission Type Admitting Provider Attending Provider Department, Room/Bed    9/4/20 Elective Yuri Gonzales MD [place], Gary Thomas, MD [facility] MOTHER BABY 2A, M206/1    Discharge Date Discharge Disposition Discharge Destination                       Attending Provider:  Yuri Gonzales MD    Allergies:  No Known Allergies    Isolation:  None   Infection:  None   Code Status:  CPR    Ht:  160 cm (63\")   Wt:  77.2 kg (170 lb 3.2 oz)    Admission Cmt:  None   Principal Problem:  None                Active Insurance as of 9/4/2020     Primary Coverage     Payor Plan Insurance Group Employer/Plan Group    [employer] [place] [employer] [place]      Payor Plan Address Payor Plan Phone Number Payor Plan Fax Number Effective Dates    [address] [address] [phone]  9/2/2020 - None Entered    [facility] [address]       Subscriber Name Subscriber Birth Date Member ID       KEN OCHOA 2000 [id number]                 Emergency Contacts      (Rel.) Home Phone Work Phone Mobile Phone    MEAGAN MYERS (Mother) -- -- [phone]    MIGUELINA OCHOA (Spouse) [phone] -- --        Yocasta Beckwith, RN   Registered Nurse   OB Postpartum   Plan of Care   Signed   Date of Service:  09/08/20 0650   Creation Time:  09/08/20 0650            Signed             Show:Clear all  []Manual[x]Template[]Copied    Added by:  [x]Yocasta Beckwith, RN    [][facility] for details     Problem: Patient Care Overview  Goal: Plan of Care Review  Outcome: Ongoing (interventions implemented as appropriate)  Flowsheets (Taken 9/8/2020 0647)  Progress: no " change  Plan of Care Reviewed With: patient  Outcome Summary: VSS, no c/o pain. tolerating some sips of water. has not eaten any food. pt had 1 emesis last night after drinking apple juice with NST. hypersalivation continues. PICC line with TPN and LR infusing. bilirubin levels elevated (1.6). will repeat gallbladder u/s today.              Melanie Mayo, RN   Registered Nurse   OB Postpartum   Plan of Care   Signed   Date of Service:  09/07/20 1810   Creation Time:  09/07/20 1810            Signed             Show:Clear all  []Manual[x]Template[]Copied    Added by:  [x]Melanie Mayo, RN    []Hover for details     Problem: Patient Care Overview  Goal: Plan of Care Review  Outcome: Ongoing (interventions implemented as appropriate)  Flowsheets  Taken 9/6/2020 2100 by Yocasta Beckwith, RN  Plan of Care Reviewed With: patient  Taken 9/7/2020 1808 by Melanie Mayo, RN  Outcome Summary: VSS, no c/o pain, tolerating ice chips and some clear liquids, pt put on bland diet today but hasnt ate much, no vomiting or nausea but has hypersalivation, PICC line with TPN and lipids infusing, LR infusing, had visitors today, anxious to go home.                       Vital Signs (last day)     Date/Time   Temp   Temp src   Pulse   Resp   BP   Patient Position   SpO2    09/08/20 0800   98.3 (36.8)   Oral   103   16   106/65   Lying   97    09/07/20 1930   98.9 (37.2)   Temporal   115   18   124/84   Lying   99    09/07/20 0814   97.8 (36.6)   Temporal   94   18   144/65   Lying   99    09/07/20 0415   98 (36.7)   Oral   104   18   106/68   Lying   98    09/07/20 0000   98.2 (36.8)   Oral   107   18   115/68   Lying   99          Radiology Results (last 21 days)     Procedure Component Value Units Date/Time   FL Upper GI Single Contrast Without KUB [[id number]] Thanh as Reviewed   Order Status: Completed Collected: 09/04/20 1408    Updated: 09/04/20 1416   Narrative:     HISTORY: Persistent vomiting, 30 weeks  pregnant     Limited upper GI exam: The exam is discussed with the patient. The  theoretical risk of the radiation exposure to the third trimester fetus  is discussed including the theoretical risk of childhood  lymphoma/leukemia. Patient technologist the low risk consents for the  limited upper GI exam. Pulse dosing utilized for the single contrasted  upper GI exam.     Fluoroscopy time 19 seconds, dose 2.52 mGy     10 images are saved for the exam.     Patient ingests the thin liquid barium and delayed images are obtained.  There is severe intermittent gastroesophageal reflux noted throughout  the duration of the exam. There is gastric trace is no mechanical  obstruction. With the patient in the right lateral semierect  orientation, there is emptying of the contrast from the stomach into the  duodenum. No evidence of pyloric stenosis. Duodenal bulb unremarkable.  No duodenal dilatation. No malrotation.      Impression:     1. Severe intermittent gastroesophageal reflux with gastroparesis. No  mechanical gastric obstruction. No duodenal dilatation or malrotation.     Comments: Findings are discussed with Dr. Westbrook at 2:10 PM on  9/4/2020.  This report was finalized on 09/04/2020 14:13 by Dr. Johnna Mcbride MD.         Intake & Output (last day)       09/07 0701 - 09/08 0700 09/08 0701 - 09/09 0700    I.V. (mL/kg)      TPN      Total Intake(mL/kg)      Urine (mL/kg/hr) 1400 (0.8) 350 (0.9)    Emesis/NG output 0     Total Output 1400 350    Net -1400 -350          Emesis Unmeasured Occurrence 1 x         Current Facility-Administered Medications   Medication Dose Route Frequency Provider Last Rate Last Dose   • Adult Standard Central TPN   Intravenous Q24H (TPN) Yuri Gonzales MD 65 mL/hr at 09/07/20 1738     • Fat Emulsion Plant Based (INTRALIPID,LIPOSYN) 20 % infusion 50 g  250 mL Intravenous Q24H (TPN) Yuri Gonzales MD 20.8 mL/hr at 09/07/20 1739 50 g at 09/07/20 1739   • lactated ringers  infusion  105 mL/hr Intravenous Continuous Yuri Gonzales  mL/hr at 09/07/20 1930 105 mL/hr at 09/07/20 1930   • metoclopramide (REGLAN) injection 10 mg  10 mg Intravenous Q8H Sebastian Westbrook MD   10 mg at 09/08/20 1035   • pantoprazole (PROTONIX) EC tablet 40 mg  40 mg Oral Q12H Yuri Gonzales MD        Or   • pantoprazole (PROTONIX) injection 40 mg  40 mg Intravenous Q12H Yuri Gonzales MD   40 mg at 09/08/20 0920   • promethazine (PHENERGAN) suppository 25 mg  25 mg Rectal Q6H PRN Yuri Gonzales MD   25 mg at 09/04/20 2217     Orders (last 24 hrs)      Start     Ordered    09/08/20 0848  Inpatient Case Management  Consult  Once     Provider:  (Not yet assigned)    09/08/20 0849    09/08/20 0656  Nutrition Consult  Once     Provider:  (Not yet assigned)    09/08/20 0657    09/08/20 0655  US Pancreas  1 Time Imaging      09/08/20 0657    09/08/20 0654  US Gallbladder  1 Time Imaging      09/08/20 0657    09/08/20 0653  Lipase  Once      09/08/20 0657    09/08/20 0653  Comprehensive Metabolic Panel  Once      09/08/20 0657    09/08/20 0652  Amylase  Once      09/08/20 0657    09/08/20 0629  Diet Regular; [place]  Diet Effective Now     Comments:  Add a Boost to each tray for each meal    09/08/20 0629    09/08/20 0600  CBC Auto Differential  PROCEDURE ONCE      09/08/20 0001    09/08/20 0549  POC Glucose Once  Once      09/08/20 0533    09/08/20 0014  POC Glucose Once  Once      09/07/20 2358    09/07/20 1816  POC Glucose Once  Once      09/07/20 1802    09/07/20 1546  Comprehensive Metabolic Panel  Every Monday 09/04/20 1547    09/07/20 1546  C-reactive Protein  Every Monday 09/04/20 1547    09/07/20 1546  Prealbumin  Every Monday 09/04/20 1547    09/07/20 1546  Calcium, Ionized  Every Monday 09/04/20 1547    09/07/20 1546  Triglycerides  Every Monday 09/04/20 1547    09/07/20 1401  POC Glucose Once  Once      09/07/20 1338    09/07/20  1237  Diet Regular; [place]  Diet Effective Now,   Status:  Canceled      20 1237    20 0600  Magnesium  Every Other Day      20 1547    20 0600  Phosphorus  Every Other Day      20 1547    20 1030  metoclopramide (REGLAN) injection 10 mg  Every 8 Hours      20 0935    20 1800  Adult Standard Central TPN  Every 24 Hours Scheduled (TPN)      20 1549    20 1245  lactated ringers infusion  Continuous      20 1149    20 0900  pantoprazole (PROTONIX) EC tablet 40 mg  Every 12 Hours Scheduled      20 0716    20 0900  pantoprazole (PROTONIX) injection 40 mg  Every 12 Hours Scheduled      20 0716    20 0717  CBC & Differential  Daily      20 0716    20 2149  promethazine (PHENERGAN) suppository 25 mg  Every 6 Hours PRN      20 2150    20 1800  POC Glucose Q6H  Every 6 Hours      20 1547    20 1800  Fat Emulsion Plant Based (INTRALIPID,LIPOSYN) 20 % infusion 50 g  Every 24 Hours Scheduled (TPN)      20 1547    20 1800  Fetal Nonstress Test  2 Times Daily      20 1612                   Physician Progress Notes (last 48 hours) (Notes from 20 1159 through 20 1159)      Yuri Gonzales MD at 20 0640          [facility]    Nubia Ochoa  : 2000  MRN: [id number]  CSN: [id number]    Antepartum Progress Note    Subjective   19 yo  at 31w3d admitted with severe hyperemesis, dehydration secondary to acid reflux and gastroparesis. She is tolerating liquids without any problems. She vomited once yesterday when she tried apple juice. She states she cannot tolerate apple juice it always makes her nauseated. She can tolerate yogart, broth, and some other foods.    Objective     Min/max vitals past 24 hours:   Temp  Min: 97.8 °F (36.6 °C)  Max: 98.9 °F (37.2 °C)  BP  Min: 124/84  Max: 144/65  Pulse  Min: 94  Max: 115  Resp  Min: 18  Max: 18         General:  well developed; well nourished  no acute distress   Heart: Not performed.   Lungs: breathing is unlabored   Abdomen: soft, non-tender; no masses  no umbilical or inguinal hernias are present  no hepato-splenomegaly   FHT's: reactive and category 1.  external monitors used   Cervix: was not checked.   Contractions: none   Back: bilateral CVA tenderness is absent          Assessment   1. IUP at 31w3d  2. Hyperemesis, dehydration, gastric reflux and gastroparesis. Low potassium and rising bilirubin (0.5 to 1.0 to 1.6)              3. Sludge on ultrasound of gallbladder  Plan   1. Per Dr Westbrook. Recheck another ultrasound of her gallbladder and pancreas. Recheck lab CMP, amylase, and lipase               2. Dietary consult  Yuri Gonzales MD  2020  06:41       19yo  at 31w2d with severe hyperemesis    Electronically signed by Yuri Gonzales MD at 20 0651     Yuri Gonzales MD at 20 0714          [facility]    Nubia Ochoa  : 2000  MRN: [id number]  CSN: [id number]    Antepartum Progress Note    Subjective   19yo  at 31w2d admitted with hyperemesis and dehydration secondary to gastric reflux and gastroparesis.Yesterday since patient had had no emesis she was given ice chips, but she advanced to full liquids off the floor and had no nausea or vomiting. She is feeling much better, signs of depression has resolved.    Objective     Min/max vitals past 24 hours:   Temp  Min: 97.8 °F (36.6 °C)  Max: 98.4 °F (36.9 °C)  BP  Min: 106/68  Max: 116/63  Pulse  Min: 96  Max: 107  Resp  Min: 16  Max: 18         General: well developed; well nourished  no acute distress   Heart: Not performed.   Lungs: breathing is unlabored   Abdomen: soft, non-tender; no masses  no umbilical or inguinal hernias are present  no hepato-splenomegaly   FHT's: reactive and category 1.  external monitors used   Cervix: was not checked.   Contractions: rare   Back: bilateral CVA tenderness is absent           Assessment   3. IUP at 31w2d  4. Hyperemesis, dehydration, reflux and gastroparesis.    Plan   2. Continue current course, possibly advance diet to bland (gallbladder sludge on previous ultrasound)    Yuri Gonzales MD  2020  07:14             Electronically signed by Yuri Gonzales MD at 20        Sebastian Westbrook MD   Physician   Maternal-Fetal Medicine   Progress Notes   Signed   Date of Service:  20   Creation Time:  20            Signed             Show:Clear all  [x]Manual[x]Template[x]Copied    Added by:  [x]Sebastian Westbrook MD    [][place] for details  MFM note     Multiple conversations about this patient over the last few weeks     Nubia is a 22yo   Currently 30 6/7 weeks  Prenatal care with Dr Foreman at [facility]     Has had worsening reflux x weeks  Worsened about 2 weeks ago  Has had multiple evaluations at [facility] as well as a 3 day admission secondary to persistent pain, reflux and vomiting. Was going to have in my office yesterday, but wanted the upper GI evaluation and that needed a covid test performed prior (which was done, negative).      Prenatal care otherwise without issue     Primarily benefited from last admission from being npo. Efforts to reintroduce any nutrients since that admission have been met with vomiting including projectile vomiting.      Arranged a limited upper GI evaluation.   Results--Severe intermittent gastroesophageal reflux with gastroparesis. No  mechanical gastric obstruction. No duodenal dilatation or malrotation.     Secondary to her significant symptomatology, the degree of reflux and gastroparesis and  gestational age, I recommend TPN be started and for Nubia to be npo for a prolonged period of time     Unable to get PICC line at [facility].   Discussed with Dr Christian Delacruz to be admitted to [facility] for:  Npo  reglan  protonix  PICC line placement  Dietary consultation with the initiation of TPN  (standard formulas)  Plan will be for home TPN therefore will need to have discharge planning/coordination of this service.        As always, if there are any questions/concerns, please do not hesitate to contact me.   Thanks  Nish  [phone]

## 2020-09-08 NOTE — PLAN OF CARE
Problem: Nutrition, Parenteral (Adult)  Goal: Signs and Symptoms of Listed Potential Problems Will be Absent, Minimized or Managed (Nutrition, Parenteral)  Note:   Home with PICC line, TPN to resume Wednesday 09/09/2020 managed by [facility]

## 2020-09-08 NOTE — PROGRESS NOTES
Continued Stay Note   [place]     Patient Name: Nubia Ochoa  MRN: [id number]  Today's Date: 9/8/2020    Admit Date: 9/4/2020    Discharge Plan     Row Name 09/08/20 1522       Plan    Plan Comments  Pt is able to be discharged today. Doris from [facility] states that she will have TPN delivered tonight. Pt has OB appointment here at [facility] at 830AM and plans on going to [facility] after appointment for education. REJI Soto aware.         Discharge Codes    No documentation.             Marquita Cantrell

## 2020-09-08 NOTE — PROGRESS NOTES
Continued Stay Note   [place]     Patient Name: Nubia Ochoa  MRN: [id number]  Today's Date: 9/8/2020    Admit Date: 9/4/2020    Discharge Plan     Row Name 09/08/20 1137       Plan    Plan Comments  RENA received phone call from Doris from [facility] who states that TPN has been approved through insurance and this is covered at 100%. Doris states that she would take care of the recipe portion of orders through pharmacy. [facility] is the only HH agency that covers [facility]. Unfortunately [facility] does not accept OB patients. RENA spoke with Doris from [facility] who states that one of [facility] nurses would be able to complete some education prior to discharge due to pt not having the option for HH but she is not sure if this will be completed this afternoon or tomorrow morning. RENA spoke with Dr. Westbrook who states that pt is able to be discharged when TPN and HH is set up. HH is not achievable but TPN can be delivered at the earliest this afternoon if education can be set up. At this time RENA is awaiting a phone call from [facility] to determine when a nurse from [facility] can complete teaching with pt. REJI Soto notified.         Discharge Codes    No documentation.             Marquita Cantrell

## 2020-09-08 NOTE — DISCHARGE SUMMARY
Riki Ochoa  : 2000  MRN: [id number]  CSN: [id number]    Antepartum discharge summary    Subjective   21 yo  at 31w3d with hyperemesis and dehydration in for parenteral hyperalimentation     Objective     Min/max vitals past 24 hours:   Temp  Min: 98.1 °F (36.7 °C)  Max: 98.9 °F (37.2 °C)  BP  Min: 103/55  Max: 124/84  Pulse  Min: 103  Max: 115  Resp  Min: 16  Max: 18         General: well developed; well nourished  no acute distress   Heart: Not performed.   Lungs: breathing is unlabored   Abdomen: soft, non-tender; no masses  no umbilical or inguinal hernias are present  no hepato-splenomegaly   FHT's: reactive and category 1.  external monitors used   Cervix: was not checked.   Contractions: none   Back: bilateral CVA tenderness is absent           Assessment   1. IUP at 31w3d  2. Severe gastric reflux and gastroparesis     Plan   1. Arrangements have been made for patient to receive hyperalimentation as outpatient, she has a follow up appointment with Dr Westbrook tomorrow. Results of her ultrasound are pending and her bilirubin has decreased to 1.3. She has no emesis and tolerating a liquid diet with bananas.    Yuri Gonzales MD  2020  16:12

## 2020-09-08 NOTE — DISCHARGE PLACEMENT REQUEST
"Ken Ochoa (20 y.o. Female)     Date of Birth Social Security Number Address Home Phone MRN    2000  [address] [place] [address] [phone] [id number]    Anglican Marital Status          Yarsani        Admission Date Admission Type Admitting Provider Attending Provider Department, Room/Bed    20 Elective Yuri Gonzales MD [place], Gary Thomas, MD [facility], M206/    Discharge Date Discharge Disposition Discharge Destination                       Attending Provider:  Yuri Gonzales MD    Allergies:  No Known Allergies    Isolation:  None   Infection:  None   Code Status:  CPR    Ht:  160 cm (63\")   Wt:  77.2 kg (170 lb 3.2 oz)    Admission Cmt:  None   Principal Problem:  None                Active Insurance as of 2020     Primary Coverage     Payor Plan Insurance Group Employer/Plan Group    [employer] [place] [employer] [place]      Payor Plan Address Payor Plan Phone Number Payor Plan Fax Number Effective Dates    [address] [address] [phone]  2020 - None Entered    [facility] [id number]       Subscriber Name Subscriber Birth Date Member ID       KEN OCHOA 2000 [id number]                 Emergency Contacts      (Rel.) Home Phone Work Phone Mobile Phone    MEAGAN MYERS (Mother) -- -- [phone]    MIGUELINA OCHOA (Spouse) [phone] -- --               History & Physical      Yuri Gonzales MD at 20 0652          [facility]  Obstetric History and Physical    Chief Complaint   Patient presents with   • Other     HERE FOR TPN AND PICC LINE PLACEMENT       Subjective     Patient is a 20 y.o. female  currently at 31w0d, who was seeing Dr Westbrook. She was having hyperemesis, been hospitalized several times. Upper GI showed gastric reflux and gastroparesis. Dr Westbrook recommended that it was time for hyperalimentation. Bruna had no body that could put in a pick this weekend so she " was transferred to [facility] for a pick line and hyperalimentation. [facility]. She has lost 12 pounds this past several weeks and feels she cannot keep anything down with out it coming back up. I did notice she had a gall bladder scan that showed sludge.    Her prenatal care is complicated by  hyperemesis gravidarum.  Her previous obstetric/gynecological history is noted for is non-contributory.    The following portions of the patients history were reviewed and updated as appropriate: current medications, allergies, past medical history, past surgical history, past family history, past social history and problem list .       Prenatal Information:   Maternal Prenatal Labs  Blood Type No results found for: ABO   Rh Status No results found for: RH   Antibody Screen No results found for: ABSCRN   Gonnorhea No results found for: GCCX   Chlamydia No results found for: CLAMYDCU   RPR No results found for: RPR   Syphilis Antibody No results found for: SYPHILIS   Rubella No results found for: RUBELLAIGGIN   Hepatitis B Surface Antigen No results found for: HEPBSAG   HIV-1 Antibody No results found for: LABHIV1   Hepatitis C Antibody No results found for: HEPCAB   Rapid Urin Drug Screen No results found for: AMPMETHU, BARBITSCNUR, LABBENZSCN, LABMETHSCN, LABOPIASCN, THCURSCR, COCAINEUR, AMPHETSCREEN, PROPOXSCN, BUPRENORSCNU, METAMPSCNUR, OXYCODONESCN, TRICYCLICSCN   Group B Strep Culture No results found for: GBSANTIGEN                 Past OB History:     OB History    Para Term  AB Living   2 1 1 0 0 1   SAB TAB Ectopic Molar Multiple Live Births   0 0 0 0 0 1      # Outcome Date GA Lbr Richi/2nd Weight Sex Delivery Anes PTL Lv   2 Current            1 Term 17    M Vag-Spont None  SCOUT       Allergies:  No Known Allergies      Current Facility-Administered Medications:   •  Adult Standard Central TPN, , Intravenous, Q24H (TPN), Last Rate: 20 mL/hr at 20 7928  **AND** Fat Emulsion Plant Based (INTRALIPID,LIPOSYN) 20 % infusion 50 g, 250 mL, Intravenous, Q24H (TPN), Yuri Gonzales MD, Stopped at 09/05/20 0530  •  Adult Standard Central TPN, , Intravenous, Q24H (TPN), Yuri Gonzales MD  •  promethazine (PHENERGAN) suppository 25 mg, 25 mg, Rectal, Q6H PRN, Yuri Gonzales MD, 25 mg at 09/04/20 2217    HPI      Review of Systems       Otherwise complete ROS reviewed and negative except as mentioned in the HPI.    History reviewed. No pertinent past medical history.    Past Medical History: none    Past Surgical History:   Procedure Laterality Date   • TONSILLECTOMY         Past Surgical History:  This patient has no significant past surgical history    Social History     Socioeconomic History   • Marital status:      Spouse name: Not on file   • Number of children: Not on file   • Years of education: Not on file   • Highest education level: Not on file   Tobacco Use   • Smoking status: Never Smoker   • Smokeless tobacco: Never Used   Substance and Sexual Activity   • Drug use: Never       Social History:  Marital Status:  Single                              Smoker:  Never smoker                             Illicit Drugs:  Illicit drug use:  none                             Alcohol:  Alcohol use: none    History reviewed. No pertinent family history.                              Family History:   Non-Contributory           Objective     Vital Signs Range for the last 24 hours  Temperature: Temp:  [97.4 °F (36.3 °C)-98.4 °F (36.9 °C)] 97.4 °F (36.3 °C)   Temp Source: Temp src: Temporal   BP: BP: (109-126)/(62-76) 116/63   Pulse: Heart Rate:  [] 104   Respirations: Resp:  [16-18] 16   SPO2: SpO2:  [97 %-100 %] 97 %   O2 Amount (l/min):     O2 Devices Device (Oxygen Therapy): room air   Weight: Weight:  [75.3 kg (166 lb)] 75.3 kg (166 lb)     General appearance:  alert, , oriented to person, place, and time, anxious  Mental Status:  normal mood,  behavior, speech, dress, motor activity, and thought processes, anxious  Eyes:  pupils equal and reactive to light  Ears:  bilateral TM's and external ear canals normal  Nose:  normal and patent, no erythema, discharge or polyps  Mouth:  mucous membranes moist, pharynx normal without lesions  Neck:  supple, no significant adenopathy  Lymphatics:  no palpable lymphadenopathy, no hepatosplenomegaly  Chest:  clear to auscultation, no wheezes, rales or rhonchi, symmetric air entry  Heart::  normal rate, regular rhythm, normal S1, S2, no murmurs, rubs, clicks or gallops  Abdomen:  soft, nontender, nondistended, no masses or organomegaly  Breasts:  breasts appear normal, no suspicious masses, no skin or nipple changes or axillary nodes  Pelvic:  UTERUS: uterus is normal size, shape, consistency and nontender, enlarged to 30 week's size  Rectal:  normal rectal, no masses  Back exam:  full range of motion, no tenderness, palpable spasm or pain on motion  Neurological:  alert, oriented, normal speech, no focal findings or movement disorder noted  Musculoskeletal:  no joint tenderness, deformity or swelling  Extremities:   peripheral pulses normal, no pedal edema, no clubbing or cyanosis  Skin:  normal coloration and turgor, no rashes, no suspicious skin lesions noted      Presentation: vertex   Cervix: Exam by:     Dilation:closed     Effacement:thick     Station:OOP       Fetal Heart Rate Assessment   Method: Fetal HR Assessment Method: external   Beats/min: Fetal HR (beats/min): 140   Baseline: Fetal Heart Baseline Rate: normal range   Varibility: Fetal HR Variability: moderate (amplitude range 6 to 25 bpm)   Accels: Fetal HR Accelerations: greater than/equal to 15 bpm, lasting at least 15 seconds   Decels: Fetal HR Decelerations: variable   Tracing Category:       Uterine Assessment   Method: Method: palpation, external tocotransducer   Frequency (min): Contraction Frequency (Minutes): occaisonal   Ctx Count in 10 min:      Duration:     Intensity: Contraction Intensity: mild by palpation   Intensity by IUPC:     Resting Tone: Uterine Resting Tone: soft by palpation   Resting Tone by IUPC:     [place] Units:       Laboratory Results: Reviewed  Radiology Review: Yes      Assessment/Plan       Assessment:  1.  Intrauterine pregnancy at 31w0d weeks gestation with reactive fetal status.    2.  nausea/vomiting secondary to gastric reflux and gastroparesis  3.  Obstetrical history significant for persistent nausea and vomiting failed conservative management.  4.  GBS status: No results found for: GBSANTIGEN    Plan:  1. NPO with hyperalimentation 2. Plan of care has been reviewed with patient and male   3.  Risks, benefits of treatment plan have been discussed.  4.  All questions have been answered.  5.        Yuri Gonzales MD  2020  06:53      Electronically signed by Yuri Gonzales MD at 20 0706          Physician Progress Notes (most recent note)      Yuri Gonzales MD at 20 0640           Riki Ochoa  : 2000  MRN: [id number]  CSN: [id number]    Antepartum Progress Note    Subjective   19 yo  at 31w3d admitted with severe hyperemesis, dehydration secondary to acid reflux and gastroparesis. She is tolerating liquids without any problems. She vomited once yesterday when she tried apple juice. She states she cannot tolerate apple juice it always makes her nauseated. She can tolerate yogart, broth, and some other foods.    Objective     Min/max vitals past 24 hours:   Temp  Min: 97.8 °F (36.6 °C)  Max: 98.9 °F (37.2 °C)  BP  Min: 124/84  Max: 144/65  Pulse  Min: 94  Max: 115  Resp  Min: 18  Max: 18         General: well developed; well nourished  no acute distress   Heart: Not performed.   Lungs: breathing is unlabored   Abdomen: soft, non-tender; no masses  no umbilical or inguinal hernias are present  no hepato-splenomegaly   FHT's: reactive and category  1.  external monitors used   Cervix: was not checked.   Contractions: none   Back: bilateral CVA tenderness is absent          Assessment   1. IUP at 31w3d  2. Hyperemesis, dehydration, gastric reflux and gastroparesis. Low potassium and rising bilirubin (0.5 to 1.0 to 1.6)              3. Sludge on ultrasound of gallbladder  Plan   1. Per Dr Westbrook. Recheck another ultrasound of her gallbladder and pancreas. Recheck lab CMP, amylase, and lipase               2. Dietary consult  Yuri Gonzales MD  2020  06:41       19yo  at 31w2d with severe hyperemesis    Electronically signed by Yuri Gonzales MD at 20 0651       Consult Notes (most recent note)    No notes of this type exist for this encounter.         Physical Therapy Notes (most recent note)    No notes exist for this encounter.         Occupational Therapy Notes (most recent note)    No notes exist for this encounter.         Discharge Summary    No notes of this type exist for this encounter.         [facility]  [address] [place] [id number]  Phone:  [phone]  Fax:          Patient:     Nubia Ochoa MRN:  [id number]   [address] [place] [address] :  2000  SSN:    Phone: [phone] Sex:  F      INSURANCE PAYOR PLAN GROUP # SUBSCRIBER ID   Primary:    [facility] [id number]   [id number]   Admitting Diagnosis: Hyperemesis affecting pregnancy, antepartum [O21.0]  Order Date:  Sep 5, 2020         Inpatient Case Management  Consult       (Order ID: [id number])     Diagnosis:         Priority:  Routine Expected Date:   Expiration Date:        Interval:   Count:    Reason for Consult? patient will need home health skilled nursing for TPN administration after discharge. TPN should run at @ 65 mL/hr     Specimen Type:   Specimen Source:   Specimen Taken Date:   Specimen Taken Time:                   Authorizing Provider:Sebastian Westbrook,  MD  Authorizing Provider's NPI: [id number]  Order Entered By: Jailyn Deluca RN 9/5/2020  2:39 PM     Electronically signed by: Sebastian Westbrook MD 9/5/2020  2:39 PM

## 2020-09-08 NOTE — PLAN OF CARE
Problem: Patient Care Overview  Goal: Plan of Care Review  Outcome: Ongoing (interventions implemented as appropriate)  Flowsheets (Taken 9/8/2020 5413)  Progress: improving  Plan of Care Reviewed With: patient  Outcome Summary: Pt reports she is hungry however does continue to have difficulty maintaining adequate PO intake.  Educated on nutrition therapy for hyperemesis/GERD/Gastroparesis.  Specifically we dicsussed nutrient dense food choices appropriate for her condition, foods that may worsen symptoms and foods that may lessen symptoms, as well as emphasized eating/drinking in limited quantities frequently.  Advised TPN is providing most of kcal/protein needs, however encouraged PO intake as tolerated.  Answered all questions.

## 2020-09-08 NOTE — NON STRESS TEST
Nubia Ochoa, a  at 31w3d with an CARMEN of 2020, by Patient Reported, was seen at [facility] for a nonstress test.    Chief Complaint   Patient presents with   • Other     HERE FOR TPN AND PICC LINE PLACEMENT       Patient Active Problem List   Diagnosis   • Hyperemesis affecting pregnancy, antepartum       Start Time: 918  Stop Time: 940    Interpretation A  Nonstress Test Interpretation A: Reactive (20 1011 : Nicci Villalobos, RN)  Comments A: DENI VILLALOBOS RNC/C WALKER RNC (20 1011 : Nicci Villalobos, RN)

## 2021-05-18 ENCOUNTER — TELEPHONE (OUTPATIENT)
Dept: OBGYN CLINIC | Age: 21
End: 2021-05-18